# Patient Record
Sex: FEMALE | Race: WHITE | Employment: FULL TIME | URBAN - METROPOLITAN AREA
[De-identification: names, ages, dates, MRNs, and addresses within clinical notes are randomized per-mention and may not be internally consistent; named-entity substitution may affect disease eponyms.]

---

## 2023-12-07 DIAGNOSIS — N92.6 MISSED PERIOD: ICD-10-CM

## 2023-12-07 LAB
ABO + RH BLD: NORMAL
BASOPHILS # BLD: 0 K/UL (ref 0–0.2)
BASOPHILS NFR BLD: 1 % (ref 0–2)
BLOOD GROUP ANTIBODIES SERPL: NORMAL
DIFFERENTIAL METHOD BLD: ABNORMAL
EOSINOPHIL # BLD: 0.3 K/UL (ref 0–0.8)
EOSINOPHIL NFR BLD: 3 % (ref 0.5–7.8)
ERYTHROCYTE [DISTWIDTH] IN BLOOD BY AUTOMATED COUNT: 14.7 % (ref 11.9–14.6)
HBV SURFACE AG SER QL: NONREACTIVE
HCT VFR BLD AUTO: 36.9 % (ref 35.8–46.3)
HCV AB SER QL: NONREACTIVE
HGB BLD-MCNC: 11.7 G/DL (ref 11.7–15.4)
HIV 1+2 AB+HIV1 P24 AG SERPL QL IA: NONREACTIVE
HIV 1/2 RESULT COMMENT: NORMAL
IMM GRANULOCYTES # BLD AUTO: 0 K/UL (ref 0–0.5)
IMM GRANULOCYTES NFR BLD AUTO: 0 % (ref 0–5)
LYMPHOCYTES # BLD: 1.5 K/UL (ref 0.5–4.6)
LYMPHOCYTES NFR BLD: 21 % (ref 13–44)
MCH RBC QN AUTO: 25.9 PG (ref 26.1–32.9)
MCHC RBC AUTO-ENTMCNC: 31.7 G/DL (ref 31.4–35)
MCV RBC AUTO: 81.6 FL (ref 82–102)
MONOCYTES # BLD: 0.5 K/UL (ref 0.1–1.3)
MONOCYTES NFR BLD: 7 % (ref 4–12)
NEUTS SEG # BLD: 4.9 K/UL (ref 1.7–8.2)
NEUTS SEG NFR BLD: 67 % (ref 43–78)
NRBC # BLD: 0 K/UL (ref 0–0.2)
PLATELET # BLD AUTO: 137 K/UL (ref 150–450)
PMV BLD AUTO: 14.9 FL (ref 9.4–12.3)
RBC # BLD AUTO: 4.52 M/UL (ref 4.05–5.2)
RUBV IGG SERPL IA-ACNC: 79.5 IU/ML
WBC # BLD AUTO: 7.3 K/UL (ref 4.3–11.1)

## 2023-12-08 LAB — RPR SER QL: NONREACTIVE

## 2024-01-02 NOTE — PROGRESS NOTES
None   Other Topics Concern    Not on file   Social History Narrative    Not on file     Social Determinants of Health     Financial Resource Strain: Low Risk  (5/3/2023)    Overall Financial Resource Strain (CARDIA)     Difficulty of Paying Living Expenses: Not hard at all   Food Insecurity: Not on file (5/3/2023)   Transportation Needs: Unknown (5/3/2023)    PRAPARE - Transportation     Lack of Transportation (Medical): Not on file     Lack of Transportation (Non-Medical): No   Physical Activity: Not on file   Stress: Not on file   Social Connections: Not on file   Intimate Partner Violence: Not on file   Housing Stability: Unknown (5/3/2023)    Housing Stability Vital Sign     Unable to Pay for Housing in the Last Year: Not on file     Number of Places Lived in the Last Year: Not on file     Unstable Housing in the Last Year: No     Family History   Problem Relation Age of Onset    Osteoarthritis Mother     Headache Brother     Asthma Brother     Psychiatric Disorder Brother         Dissociative personality disorder, Depression    Prostate Cancer Maternal Grandfather 60    Colon Cancer Maternal Grandfather     Breast Cancer Neg Hx     Ovarian Cancer Neg Hx     Deep Vein Thrombosis Neg Hx     Pulmonary Embolism Neg Hx      Allergies   Allergen Reactions    Hydrocodone-Acetaminophen Rash     Developed a rash after taking Lortab while pregnant. No issues since then.      Current Outpatient Medications on File Prior to Visit   Medication Sig Dispense Refill    Prenatal Vit-Fe Fumarate-FA (PRENATAL VITAMIN PO) Take 1 tablet by mouth daily       No current facility-administered medications on file prior to visit.       Review of Systems   Constitutional: Negative.    HENT: Negative.     Respiratory: Negative.     Cardiovascular: Negative.    Gastrointestinal: Negative.    Genitourinary:  Negative for menstrual problem, pelvic pain and vaginal discharge.   Musculoskeletal: Negative.    Neurological: Negative.

## 2024-01-04 ENCOUNTER — ROUTINE PRENATAL (OUTPATIENT)
Dept: OBGYN CLINIC | Age: 34
End: 2024-01-04
Payer: COMMERCIAL

## 2024-01-04 VITALS
BODY MASS INDEX: 22.66 KG/M2 | SYSTOLIC BLOOD PRESSURE: 110 MMHG | WEIGHT: 153 LBS | DIASTOLIC BLOOD PRESSURE: 72 MMHG | HEIGHT: 69 IN

## 2024-01-04 DIAGNOSIS — Z34.81 ENCOUNTER FOR SUPERVISION OF OTHER NORMAL PREGNANCY IN FIRST TRIMESTER: ICD-10-CM

## 2024-01-04 DIAGNOSIS — Z34.81 ENCOUNTER FOR SUPERVISION OF OTHER NORMAL PREGNANCY, FIRST TRIMESTER: ICD-10-CM

## 2024-01-04 DIAGNOSIS — O34.219 PREVIOUS CESAREAN DELIVERY AFFECTING PREGNANCY: Primary | ICD-10-CM

## 2024-01-04 DIAGNOSIS — Z87.59 HISTORY OF GESTATIONAL HYPERTENSION: ICD-10-CM

## 2024-01-04 LAB
ABO + RH BLD: NORMAL
BASOPHILS # BLD: 0.1 K/UL (ref 0–0.2)
BASOPHILS NFR BLD: 1 % (ref 0–2)
BLOOD GROUP ANTIBODIES SERPL: NORMAL
DIFFERENTIAL METHOD BLD: ABNORMAL
EOSINOPHIL # BLD: 0.2 K/UL (ref 0–0.8)
EOSINOPHIL NFR BLD: 3 % (ref 0.5–7.8)
ERYTHROCYTE [DISTWIDTH] IN BLOOD BY AUTOMATED COUNT: 15.2 % (ref 11.9–14.6)
GLUCOSE URINE, POC: NEGATIVE
HBV SURFACE AG SER QL: NONREACTIVE
HCT VFR BLD AUTO: 37.4 % (ref 35.8–46.3)
HCV AB SER QL: NONREACTIVE
HGB BLD-MCNC: 11.8 G/DL (ref 11.7–15.4)
HIV 1+2 AB+HIV1 P24 AG SERPL QL IA: NONREACTIVE
HIV 1/2 RESULT COMMENT: NORMAL
IMM GRANULOCYTES # BLD AUTO: 0 K/UL (ref 0–0.5)
IMM GRANULOCYTES NFR BLD AUTO: 0 % (ref 0–5)
LYMPHOCYTES # BLD: 0.9 K/UL (ref 0.5–4.6)
LYMPHOCYTES NFR BLD: 12 % (ref 13–44)
MCH RBC QN AUTO: 25.8 PG (ref 26.1–32.9)
MCHC RBC AUTO-ENTMCNC: 31.6 G/DL (ref 31.4–35)
MCV RBC AUTO: 81.7 FL (ref 82–102)
MONOCYTES # BLD: 0.5 K/UL (ref 0.1–1.3)
MONOCYTES NFR BLD: 6 % (ref 4–12)
NEUTS SEG # BLD: 6.5 K/UL (ref 1.7–8.2)
NEUTS SEG NFR BLD: 79 % (ref 43–78)
NRBC # BLD: 0 K/UL (ref 0–0.2)
PLATELET # BLD AUTO: 146 K/UL (ref 150–450)
PMV BLD AUTO: ABNORMAL FL (ref 9.4–12.3)
PROTEIN,URINE, POC: NEGATIVE
RBC # BLD AUTO: 4.58 M/UL (ref 4.05–5.2)
WBC # BLD AUTO: 8.2 K/UL (ref 4.3–11.1)

## 2024-01-04 PROCEDURE — 81002 URINALYSIS NONAUTO W/O SCOPE: CPT | Performed by: OBSTETRICS & GYNECOLOGY

## 2024-01-04 PROCEDURE — 0501F PRENATAL FLOW SHEET: CPT | Performed by: OBSTETRICS & GYNECOLOGY

## 2024-01-04 ASSESSMENT — ENCOUNTER SYMPTOMS
GASTROINTESTINAL NEGATIVE: 1
RESPIRATORY NEGATIVE: 1

## 2024-01-05 LAB
RPR SER QL: NONREACTIVE
RUBV IGG SERPL IA-ACNC: 97.6 IU/ML

## 2024-01-12 LAB
Lab: NEGATIVE
Lab: NEGATIVE
Lab: NORMAL
NTRA 1P36 DELETION SYNDROME POPULATION-BASED RISK TEXT: NORMAL
NTRA 1P36 DELETION SYNDROME RESULT TEXT: NORMAL
NTRA 1P36 DELETION SYNDROME RISK SCORE TEXT: NORMAL
NTRA 22Q11.2 DELETION SYNDROME POPULATION-BASED RISK TEXT: NORMAL
NTRA 22Q11.2 DELETION SYNDROME RESULT TEXT: NORMAL
NTRA 22Q11.2 DELETION SYNDROME RISK SCORE TEXT: NORMAL
NTRA ANGELMAN SYNDROME POPULATION-BASED RISK TEXT: NORMAL
NTRA ANGELMAN SYNDROME RESULT TEXT: NORMAL
NTRA ANGELMAN SYNDROME RISK SCORE TEXT: NORMAL
NTRA CRI-DU-CHAT SYNDROME POPULATION-BASED RISK TEXT: NORMAL
NTRA CRI-DU-CHAT SYNDROME RESULT TEXT: NORMAL
NTRA CRI-DU-CHAT SYNDROME RISK SCORE TEXT: NORMAL
NTRA CYSTIC FIBROSIS: NEGATIVE
NTRA FETAL FRACTION SECOND FETUS: NORMAL
NTRA FETAL FRACTION: NORMAL
NTRA FRAGILE X SYNDROME: NEGATIVE
NTRA GENDER OF FETUS: NORMAL
NTRA GENDER OF SECOND FETUS: NORMAL
NTRA MONOSOMY X AGE-BASED RISK TEXT: NORMAL
NTRA MONOSOMY X RESULT TEXT: NORMAL
NTRA MONOSOMY X RISK SCORE TEXT: NORMAL
NTRA PDF REPORT: NORMAL
NTRA PRADER-WILLI SYNDROME POPULATION-BASED RISK TEXT: NORMAL
NTRA PRADER-WILLI SYNDROME RESULT TEXT: NORMAL
NTRA PRADER-WILLI SYNDROME RISK SCORE TEXT: NORMAL
NTRA SPINAL MUSCULAR ATROPHY: NEGATIVE
NTRA TRIPLOIDY 13 18 AGE-BASED RISK TEXT: NORMAL
NTRA TRIPLOIDY 13 18 RESULT TEXT: NORMAL
NTRA TRIPLOIDY 13 18 RISK SCORE TEXT: NORMAL
NTRA TRIPLOIDY RESULT TEXT: NORMAL
NTRA TRISOMY 13 AGE-BASED RISK TEXT: NORMAL
NTRA TRISOMY 13 RESULT TEXT: NORMAL
NTRA TRISOMY 13 RISK SCORE TEXT: NORMAL
NTRA TRISOMY 18 AGE-BASED RISK TEXT: NORMAL
NTRA TRISOMY 18 RESULT TEXT: NORMAL
NTRA TRISOMY 18 RISK SCORE TEXT: NORMAL
NTRA TRISOMY 21 AGE-BASED RISK TEXT: NORMAL
NTRA TRISOMY 21 RESULT TEXT: NORMAL
NTRA TRISOMY 21 RISK SCORE TEXT: NORMAL
NTRA ZYGOSITY: NORMAL

## 2024-01-23 ENCOUNTER — NURSE ONLY (OUTPATIENT)
Dept: OBGYN CLINIC | Age: 34
End: 2024-01-23

## 2024-01-23 VITALS — WEIGHT: 153.6 LBS | BODY MASS INDEX: 22.85 KG/M2

## 2024-01-23 DIAGNOSIS — Z87.59 HISTORY OF GESTATIONAL HYPERTENSION: ICD-10-CM

## 2024-01-23 DIAGNOSIS — Z34.82 PRENATAL CARE, SUBSEQUENT PREGNANCY IN SECOND TRIMESTER: ICD-10-CM

## 2024-01-23 DIAGNOSIS — R87.810 ASCUS WITH POSITIVE HIGH RISK HPV CERVICAL: ICD-10-CM

## 2024-01-23 DIAGNOSIS — Z3A.15 15 WEEKS GESTATION OF PREGNANCY: ICD-10-CM

## 2024-01-23 DIAGNOSIS — O09.522 ADVANCED MATERNAL AGE IN MULTIGRAVIDA, SECOND TRIMESTER: Primary | ICD-10-CM

## 2024-01-23 DIAGNOSIS — R87.610 ASCUS WITH POSITIVE HIGH RISK HPV CERVICAL: ICD-10-CM

## 2024-01-23 LAB
ALBUMIN SERPL-MCNC: 3.1 G/DL (ref 3.5–5)
ALBUMIN/GLOB SERPL: 0.8 (ref 0.4–1.6)
ALP SERPL-CCNC: 62 U/L (ref 50–136)
ALT SERPL-CCNC: 17 U/L (ref 12–65)
ANION GAP SERPL CALC-SCNC: 3 MMOL/L (ref 2–11)
AST SERPL-CCNC: 11 U/L (ref 15–37)
BILIRUB SERPL-MCNC: 0.2 MG/DL (ref 0.2–1.1)
BUN SERPL-MCNC: 5 MG/DL (ref 6–23)
CALCIUM SERPL-MCNC: 8.2 MG/DL (ref 8.3–10.4)
CHLORIDE SERPL-SCNC: 109 MMOL/L (ref 103–113)
CO2 SERPL-SCNC: 25 MMOL/L (ref 21–32)
CREAT SERPL-MCNC: 0.6 MG/DL (ref 0.6–1)
CREAT UR-MCNC: 96 MG/DL
ERYTHROCYTE [DISTWIDTH] IN BLOOD BY AUTOMATED COUNT: 16.1 % (ref 11.9–14.6)
GLOBULIN SER CALC-MCNC: 3.9 G/DL (ref 2.8–4.5)
GLUCOSE SERPL-MCNC: 92 MG/DL (ref 65–100)
HCT VFR BLD AUTO: 34.2 % (ref 35.8–46.3)
HGB BLD-MCNC: 10.8 G/DL (ref 11.7–15.4)
LDH SERPL L TO P-CCNC: 180 U/L (ref 100–190)
MCH RBC QN AUTO: 25.7 PG (ref 26.1–32.9)
MCHC RBC AUTO-ENTMCNC: 31.6 G/DL (ref 31.4–35)
MCV RBC AUTO: 81.4 FL (ref 82–102)
NRBC # BLD: 0 K/UL (ref 0–0.2)
PLATELET # BLD AUTO: 126 K/UL (ref 150–450)
PMV BLD AUTO: ABNORMAL FL (ref 9.4–12.3)
POTASSIUM SERPL-SCNC: 3.6 MMOL/L (ref 3.5–5.1)
PROT SERPL-MCNC: 7 G/DL (ref 6.3–8.2)
PROT UR-MCNC: 10 MG/DL
PROT/CREAT UR-RTO: 0.1
RBC # BLD AUTO: 4.2 M/UL (ref 4.05–5.2)
SODIUM SERPL-SCNC: 137 MMOL/L (ref 136–146)
URATE SERPL-MCNC: 3.2 MG/DL (ref 2.6–6)
WBC # BLD AUTO: 6.8 K/UL (ref 4.3–11.1)

## 2024-01-23 NOTE — PATIENT INSTRUCTIONS
medical care if:    You have signs of preeclampsia, such as:  Sudden swelling of your face, hands, or feet.  New vision problems (such as dimness, blurring, or seeing spots).  A severe headache.     You have symptoms of a blood clot in your leg (called a deep vein thrombosis), such as:  Pain in the calf, back of the knee, thigh, or groin.  Swelling in the leg or groin.  A color change on the leg or groin. The skin may be reddish or purplish, depending on your usual skin color.     You have any vaginal bleeding.     You have belly pain or cramping.     You have a fever.     You've been having regular contractions for an hour. This means that you've had at least 6 contractions within 1 hour, even after you change your position and drink fluids.     You have symptoms of a urinary tract infection. These may include:  Pain or burning when you urinate.  A frequent need to urinate without being able to pass much urine.  Pain in your low back (below the rib cage and above the waist).  Blood in your urine.     You have a sudden release of fluid from your vagina.     You have low back pain or pelvic pressure that does not go away.     You notice that your baby has stopped moving or is moving less than normal.   Watch closely for changes in your health, and be sure to contact your doctor if:    You have vaginal discharge that smells bad.     You feel sad, anxious, or hopeless for more than a few days.     You have other concerns about your pregnancy.   Where can you learn more?  Go to https://www.[employer].net/patientEd and enter Y951 to learn more about \"Pregnancy Precautions: Care Instructions.\"  Current as of: July 11, 2023               Content Version: 13.9  © 2377-1814 [employer].   Care instructions adapted under license by [employer]. If you have questions about a medical condition or this instruction, always ask your healthcare professional. [employer] disclaims any warranty or liability

## 2024-01-23 NOTE — PROGRESS NOTES
NOB consult with pt. Labs today: Urine Cx (not collected at previous visit), Toxoplasma Abs (due to cat living in home) and Baseline Hellp labs (Due to hx GHTN). Offered pt the option of CF, SMA, and Fragile X carrier testing. Pt desires testing (collected on 24-Neg). Offered pt the option of genetic screening (1st screen vs Tetra vs NIPT). Pt desires NIPT (Already collected on -Low Risk). Instructed pt on exercise/nutrition in pregnancy. Reviewed [facility] preg book. Advised pt on using [facility] for hospital needs and [facility] L&D for pregnancy related emergencies. Pt states understanding. NOB forms signed, scanned, and given to pt.     Medical Hx: Abnormal Pap (2020 ASCUS + HR HPV (during pregnancy) no F/U pap found in EMR (2023)  5/3/23  LGSIL + HPV  23  Colpo:  12:00 LGSIL    2, 6 & 10:00 all nl.   ECC nl---> R pap 1 year, Anemia (no hx iron tx), Asthma (Bronchiole asthma-due to mother smoking when younger), Endometriosis (Laparoscopy ~ 14 yo, Dr Robles told pt + endometriosis: uterus, ovary, intestine), GERD (since G1), HSV-2 infection (Diagnosed when she was 16. Has not had any outbreaks in years), Hx GHTN, Multiple atypical nevi, Thrombocytopenia.     Surgical Hx:  Section x2 ( and ), Colposcopy (), Laparoscopy due to  endometriosis:  involving her: uterus, ovaries and intestines, Tonsillectomy and [place] Tooth Extraction.     Last Pap: 2023: LSIL/+HPV, S/p Colpo: 12:00 LSIL, 2, 6 and 10 all nl. Will repeat pap in 1 year. Pt notified that STD screening will be done at [facility].      Hx abnormal paps:     2020  ASCUS + HR HPV (during pregnancy) no F/U pap found in EMR (2023)  5/3/23  LGSIL + HPV  23  Colpo:  12:00 LGSIL    2, 6 & 10:00 all nl.   ECC nl---> R pap 1 year.    Pt OB c/o: Patient with hx hemorrhoids. Has been using Preparation H and Tucks. States constipation depends on her diet. Discussed use of Miralax and Colace as needed.     Patient also states had nausea in the

## 2024-01-24 LAB
T GONDII IGG SERPL IA-ACNC: 5.1 IU/ML (ref 0–7.1)
T GONDII IGM SER IA-ACNC: <3 AU/ML (ref 0–7.9)
TOXOPLASMA COMMENT: NORMAL

## 2024-01-26 LAB
BACTERIA SPEC CULT: NORMAL
BACTERIA SPEC CULT: NORMAL
SERVICE CMNT-IMP: NORMAL

## 2024-02-01 ENCOUNTER — ROUTINE PRENATAL (OUTPATIENT)
Dept: OBGYN CLINIC | Age: 34
End: 2024-02-01

## 2024-02-01 VITALS
HEIGHT: 69 IN | BODY MASS INDEX: 22.81 KG/M2 | DIASTOLIC BLOOD PRESSURE: 62 MMHG | WEIGHT: 154 LBS | SYSTOLIC BLOOD PRESSURE: 120 MMHG

## 2024-02-01 DIAGNOSIS — J45.20 MILD INTERMITTENT ASTHMA WITHOUT COMPLICATION: ICD-10-CM

## 2024-02-01 DIAGNOSIS — O09.522 MULTIGRAVIDA OF ADVANCED MATERNAL AGE IN SECOND TRIMESTER: ICD-10-CM

## 2024-02-01 DIAGNOSIS — Z87.59 HISTORY OF GESTATIONAL HYPERTENSION: ICD-10-CM

## 2024-02-01 DIAGNOSIS — Z11.3 SCREEN FOR STD (SEXUALLY TRANSMITTED DISEASE): ICD-10-CM

## 2024-02-01 DIAGNOSIS — O98.512 HERPES SIMPLEX TYPE 2 (HSV-2) INFECTION AFFECTING PREGNANCY, ANTEPARTUM, SECOND TRIMESTER: ICD-10-CM

## 2024-02-01 DIAGNOSIS — D50.9 IRON DEFICIENCY ANEMIA, UNSPECIFIED IRON DEFICIENCY ANEMIA TYPE: ICD-10-CM

## 2024-02-01 DIAGNOSIS — B00.9 HERPES SIMPLEX TYPE 2 (HSV-2) INFECTION AFFECTING PREGNANCY, ANTEPARTUM, SECOND TRIMESTER: ICD-10-CM

## 2024-02-01 DIAGNOSIS — Z34.82 ENCOUNTER FOR SUPERVISION OF OTHER NORMAL PREGNANCY IN SECOND TRIMESTER: Primary | ICD-10-CM

## 2024-02-01 DIAGNOSIS — O34.219 PREVIOUS CESAREAN DELIVERY AFFECTING PREGNANCY: ICD-10-CM

## 2024-02-01 PROCEDURE — 0502F SUBSEQUENT PRENATAL CARE: CPT | Performed by: OBSTETRICS & GYNECOLOGY

## 2024-02-01 NOTE — PROGRESS NOTES
OB return  Skyla Perez is a 33 y.o. female   with 17w1d IUP with Estimated Date of Delivery: 7/10/24 presenting to the clinic as a routine OB.   Denies any complaints.    Problem list reviewed and updated    Pregnancy complicated by:  1. Previous CD x2- for repeat CD  2. Hx GHTN- not on bp meds, recommend two baby ASA  3. Hx of abnormal pap smear  without followup and - sp colpo ,next due in   4. Asthma- well controlled  5. HSV2- ppx at 36 weeks  6. Anemia  7. AMA- 33 yr old     Physical Examination:  /62   Ht 1.74 m (5' 8.5\")   Wt 69.9 kg (154 lb)   LMP 10/04/2023 (Exact Date)   BMI 23.08 kg/m²    Gen: AAOx3  Ab: soft, NTTP, gravid uterus  Skin: no edema    Plan:  --nuswab today  --RTC 4 weeks with anatomy

## 2024-02-04 LAB
A VAGINAE DNA VAG QL NAA+PROBE: NORMAL SCORE
BVAB2 DNA VAG QL NAA+PROBE: NORMAL SCORE
C ALBICANS DNA VAG QL NAA+PROBE: NEGATIVE
C GLABRATA DNA VAG QL NAA+PROBE: NEGATIVE
MEGA1 DNA VAG QL NAA+PROBE: NORMAL SCORE
SPECIMEN SOURCE: NORMAL

## 2024-02-06 LAB
A VAGINAE DNA VAG QL NAA+PROBE: NORMAL SCORE
BVAB2 DNA VAG QL NAA+PROBE: NORMAL SCORE
C ALBICANS DNA VAG QL NAA+PROBE: NEGATIVE
C GLABRATA DNA VAG QL NAA+PROBE: NEGATIVE
C TRACH RRNA SPEC QL NAA+PROBE: NEGATIVE
MEGA1 DNA VAG QL NAA+PROBE: NORMAL SCORE
N GONORRHOEA RRNA SPEC QL NAA+PROBE: NEGATIVE
SPECIMEN SOURCE: NORMAL
T VAGINALIS RRNA SPEC QL NAA+PROBE: NEGATIVE

## 2024-02-22 ENCOUNTER — ROUTINE PRENATAL (OUTPATIENT)
Dept: OBGYN CLINIC | Age: 34
End: 2024-02-22
Payer: COMMERCIAL

## 2024-02-22 VITALS — SYSTOLIC BLOOD PRESSURE: 114 MMHG | DIASTOLIC BLOOD PRESSURE: 66 MMHG

## 2024-02-22 DIAGNOSIS — Z13.32 ENCOUNTER FOR SCREENING FOR MATERNAL DEPRESSION: ICD-10-CM

## 2024-02-22 DIAGNOSIS — O99.012 ANEMIA AFFECTING PREGNANCY IN SECOND TRIMESTER: ICD-10-CM

## 2024-02-22 DIAGNOSIS — O09.522 HIGH-RISK PREGNANCY, ELDERLY MULTIGRAVIDA, SECOND TRIMESTER: ICD-10-CM

## 2024-02-22 DIAGNOSIS — O98.512 HERPES SIMPLEX TYPE 2 (HSV-2) INFECTION AFFECTING PREGNANCY, ANTEPARTUM, SECOND TRIMESTER: ICD-10-CM

## 2024-02-22 DIAGNOSIS — O99.119 THROMBOCYTOPENIA AFFECTING PREGNANCY, ANTEPARTUM (HCC): Primary | ICD-10-CM

## 2024-02-22 DIAGNOSIS — Z87.59 HISTORY OF GESTATIONAL HYPERTENSION: ICD-10-CM

## 2024-02-22 DIAGNOSIS — O34.219 PREVIOUS CESAREAN DELIVERY AFFECTING PREGNANCY: ICD-10-CM

## 2024-02-22 DIAGNOSIS — Z3A.20 20 WEEKS GESTATION OF PREGNANCY: ICD-10-CM

## 2024-02-22 DIAGNOSIS — D69.6 THROMBOCYTOPENIA AFFECTING PREGNANCY, ANTEPARTUM (HCC): Primary | ICD-10-CM

## 2024-02-22 DIAGNOSIS — B00.9 HERPES SIMPLEX TYPE 2 (HSV-2) INFECTION AFFECTING PREGNANCY, ANTEPARTUM, SECOND TRIMESTER: ICD-10-CM

## 2024-02-22 PROBLEM — Z34.81 ENCOUNTER FOR SUPERVISION OF OTHER NORMAL PREGNANCY, FIRST TRIMESTER: Status: RESOLVED | Noted: 2020-01-30 | Resolved: 2024-02-22

## 2024-02-22 PROBLEM — J45.20 MILD INTERMITTENT ASTHMA WITHOUT COMPLICATION: Status: RESOLVED | Noted: 2024-02-01 | Resolved: 2024-02-22

## 2024-02-22 PROBLEM — K21.00 GASTROESOPHAGEAL REFLUX DISEASE WITH ESOPHAGITIS: Status: RESOLVED | Noted: 2019-05-03 | Resolved: 2024-02-22

## 2024-02-22 PROBLEM — Z64.1 MULTIPAROUS: Status: RESOLVED | Noted: 2020-07-06 | Resolved: 2024-02-22

## 2024-02-22 PROBLEM — D22.9 MULTIPLE ATYPICAL NEVI: Status: RESOLVED | Noted: 2019-07-24 | Resolved: 2024-02-22

## 2024-02-22 PROCEDURE — 76825 ECHO EXAM OF FETAL HEART: CPT | Performed by: OBSTETRICS & GYNECOLOGY

## 2024-02-22 PROCEDURE — 93325 DOPPLER ECHO COLOR FLOW MAPG: CPT | Performed by: OBSTETRICS & GYNECOLOGY

## 2024-02-22 PROCEDURE — 99204 OFFICE O/P NEW MOD 45 MIN: CPT | Performed by: OBSTETRICS & GYNECOLOGY

## 2024-02-22 PROCEDURE — 76811 OB US DETAILED SNGL FETUS: CPT | Performed by: OBSTETRICS & GYNECOLOGY

## 2024-02-22 PROCEDURE — 96127 BRIEF EMOTIONAL/BEHAV ASSMT: CPT | Performed by: OBSTETRICS & GYNECOLOGY

## 2024-02-22 PROCEDURE — 76827 ECHO EXAM OF FETAL HEART: CPT | Performed by: OBSTETRICS & GYNECOLOGY

## 2024-02-22 RX ORDER — IRON FUM,PS CMP/VIT C/NIACIN 125-40-3MG
1 CAPSULE ORAL DAILY
Qty: 30 CAPSULE | Refills: 5 | Status: SHIPPED | OUTPATIENT
Start: 2024-02-22

## 2024-02-22 RX ORDER — ACETAMINOPHEN 500 MG
500 TABLET ORAL EVERY 6 HOURS PRN
COMMUNITY

## 2024-02-22 RX ORDER — ASPIRIN 81 MG/1
81 TABLET ORAL DAILY
Qty: 90 TABLET | Refills: 0 | Status: SHIPPED | OUTPATIENT
Start: 2024-02-22

## 2024-02-22 ASSESSMENT — PATIENT HEALTH QUESTIONNAIRE - PHQ9
SUM OF ALL RESPONSES TO PHQ QUESTIONS 1-9: 0
2. FEELING DOWN, DEPRESSED OR HOPELESS: 0
SUM OF ALL RESPONSES TO PHQ9 QUESTIONS 1 & 2: 0
1. LITTLE INTEREST OR PLEASURE IN DOING THINGS: 0
SUM OF ALL RESPONSES TO PHQ QUESTIONS 1-9: 0

## 2024-02-22 NOTE — PATIENT INSTRUCTIONS
Resources for Depression/Anxiety  Postpartum Support International (PSI).    PSI Warmline:  2-406-081-4PPD (6281).  WWW.[email or web address].NET    Mom's IMPACTT  [email or web address]

## 2024-02-22 NOTE — PROGRESS NOTES
pregnancy in second trimester      Priority: Medium     Overview Note:     24 [facility]: Anemia noted on recent lab studies. Physiologic anemia of pregnancy is usually mild.   Start iron supplementation. Rx given.     Counseled to take iron with vitamin C, and to avoid taking with iron rich meals.     Take with stool softener, if not included in iron formulation.              Assessment & Plan Note:     Anemia in pregnancy can be defined as follows, based mostly on data in nonpregnant individuals-   First trimester - Hemoglobin <11 g/dL   Second trimester - Hemoglobin <10.5 g/dL   Third trimester - Hemoglobin <11 g/dL   Postpartum - Hemoglobin <10 g/dL     Anemia in pregnant and postpartum individuals correlates with negative  outcomes.     Throughout pregnancy, iron deficiency anemia adversely affects the maternal and fetal well-being, and is linked to increased morbidity and fetal death. Affected mothers frequently experience breathing difficulties, fainting, tiredness, palpitations, and sleep difficulties. Complaints of pica are common. Iron deficiency during the first trimester, has a more negative impact on fetal growth than anemia developing later in pregnancy. This is also true for risk of premature labor. Later in pregnancy there is increased risk of developing  infection, preeclampsia, and severe postpartum hemorrhage. Adverse  outcomes include premature labor, intrauterine growth retardation, low birth weight, birth asphyxia, and  anemia. Postpartum anemia is linked with depression, emotional instability, feelings of poor quality of life. Additionally, it also may reduce quality/quantity of breast milk.     Lowered iron stores maternal iron stores result in lower fetal/ iron stores. This deficit may persist for up to one year and result in iron deficiency anemia. Such a state should be identified and treated promptly because of the possible long term

## 2024-02-22 NOTE — ASSESSMENT & PLAN NOTE
Discussed with patient the long-term and recurrence risks associated with hypertensive disorder of pregnancy.     With a history of hypertensive disease in pregnancy, patient has increased risk of cardiovascular and cerebrovascular events in the future. A recent metaanalysis by Angelica, et al. demonstrated that over a mean follow-up of 14.1 years, the relative risk of developing chronic hypertension in those with a history of preeclampsia was 3.7. In addition, it predisposes to microalbuminuria and long lasting renal disease. Carefully chosen antihypertensives can lower the risk for both kidney and cardiac events among those with hypertension. Patient counseled to let her PCP know about this history.     All women with history of hypertensive disorders of pregnancy should maintain a healthy weight, stay active, avoid tobacco use, and be evaluated regularly for cardiovascular disease.      Her risk of recurrence (of any Hypertensive Disorder of Pregnancy) is approximately 25-40%. Recommend low dose Aspirin x 2 tablets (162 mg daily) is recommended to be started by 12-16 weeks; some benefit seen with starting up to 28 weeks.     Recommend at least 12 months between pregnancies for future children.     Preeclampsia.org for information and preeclampsia registry.     
History of Genital Herpes    Patients with recurrent genital herpes are at risk of an outbreak at the time of labor.  The newborns in these cases are at a low risk of mars the herpes virus due to immunity passed from the mother.  Among women with recurrent lesions at the time of delivery, the rate of transmission with a vaginal delivery is only 3%. For women with a history of recurrent disease and no visible lesions at delivery, the transmission risk has been estimated to be 2/10,000. The low risk is in part attributed to the presence and transplacental passage of antiherpes antibodies.  delivery is not indicated in women with a history of HSV in the absence of active genital lesions or prodromes.     Acyclovir and Valacyclovir have been repeatedly shown to decrease the incidence of recurrent HSV outbreak at the time of labor reducing the incidence of  delivery. The routine use of antiviral suppression in the third trimester has become the recommendation of ACOG in patients with a history of genital herpes.  Valtrex suppression with 500 mgs po bid starting at 36 weeks is an acceptable option for the patient who is worried about a recurrent episode and wants to avoid a  delivery.     
Thrombocytopenia in pregnancy    Platelets decline in routine pregnancies as it progresses. In an effort to not over-treat/diagnosis thrombocytopenia in pregnancy, a cut off of 100K should be used.     Differential Diagnosis of Thrombocytopenia in pregnancy  Gestational Thrombocytopenia (59%)  Diagnosis of exclusion  Generally has a jasbir no lower than 75K, resolves post partum  No increased bleeding or bruising.  No associated abnormalities on complete blood count (CBC).  No fetal or  thrombocytopenia  Risk of recurrent GT is 14-fold greater if history in prior pregnancies.     Autoimmune Thrombocytopenia  May decrease dramatically  May require steroids or IVIG.   If unresponsive to therapy, hematology should be involved in care as will not generally resolve postpartum.       Preeclampsia/HELLP (22%)  Other- antiphospholipid syndrome, disseminated intravascular coagulation, dilutional thrombocytopenia, myeloproliferative neoplasm (8%)    Goal in 3rd trimester is to keep platelet count > 100K.  Recommend monthly platelet counts in your office.  Consider use of citrated tube to verify accuracy as  it will prevent platelet \"clumping\" that is sometimes seen when drawn in purple top tube and gives falsely low count.  If platelet counts falls < 75K at any gestational age, steroids are recommended to raise platelet count.  There is no risk of maternal bleeding until platelet levels reach 50K.  At 32 weeks, platelet counts should be > 100K so that patient can receive an epidural.    Consider oral steroid course at 32 weeks in platelet count < 100K.    
both language and motor development. Breastfeeding is usually protective, but not if the mother is iron deficient. It has been noted that iron levels in breast milk fall as lactation progresses over time. Careful monitoring and adequate supplementation is therefore needed for infants at risk.    Anemia evaluation in pregnancy should evaluate hemoglobinopathies (including alpha thal, beta thal, sickle cell disease), iron deficiency, folate deficiency, B12 deficiency. Additionally, physiologic anemia must be considered as sole or contributing cause. Other less common etiologies may also be present (autoimmune, red cell disorders, G6PD deficiencies etc).  Treatment then is directed to appropriate etiology.     Please draw CBC, reticulocyte count, transferrin saturation (includes TIBC, TSat, iron), B12 level, serum ferritin at next OB appt  If not performed previously, check hemoglobin electrophoresis.     
sample.  Positive predictive and negative predictive values for these tests were explained, questions answered. Patient understands that these are screening tests that only assesses risk for select abnormalities (trisomies 13, 18, and 21, and sex chromosome abnormalities (NIPT), as well as markers for placental health (MAKEDA-A) and risk for open neural tube defects (quad)).  NIPT is designed for high risk populations, but should be considered by all patients who desire the current best option for screening for applicable genetic abnormalities.     Limitations of technology discussed based on maternal age, technical aspects of tests, and maternal BMI reviewed.  All questions answered and concerns discussed.     Patient elected to proceed with NIPT previously at OB office- results low risk.        Other events that occur with increased frequency with increasing maternal age include hypertensive disease. For this reason, I recommend daily 162mg ASA for early/severe preeclampsia prevention.     Third Trimester  There are no large randomized trials that have examined the efficacy of routine antepartum testing in women age 35 and older, therefore there is no consensus on the management of late pregnancy for this population. One strategy is to stratify women based on their risk factors, such as age and parity, and to consider other factors that might influence risk, such as pregestational obesity and race.   Recommend fetal growth evaluation at 32-34 weeks in those 35 years or older.   The risk of stillbirth increases with increasing maternal age such that women ?40 years of age have the same risk of stillbirth at 39 weeks of gestation as women in their mid-20s have at 41 weeks of gestation. For this reason, we recommend beginning testing at 37 weeks in those 40 years of age and older due to stillbirth risk.     Consider induction at 39 weeks of gestation for women who are ?35 years and primiparous, ?39 years of age, of

## 2024-02-29 ENCOUNTER — PATIENT MESSAGE (OUTPATIENT)
Dept: OBGYN CLINIC | Age: 34
End: 2024-02-29

## 2024-02-29 ENCOUNTER — ROUTINE PRENATAL (OUTPATIENT)
Dept: OBGYN CLINIC | Age: 34
End: 2024-02-29

## 2024-02-29 VITALS — WEIGHT: 156.4 LBS | BODY MASS INDEX: 23.43 KG/M2 | SYSTOLIC BLOOD PRESSURE: 124 MMHG | DIASTOLIC BLOOD PRESSURE: 60 MMHG

## 2024-02-29 DIAGNOSIS — Z87.59 HISTORY OF GESTATIONAL HYPERTENSION: ICD-10-CM

## 2024-02-29 DIAGNOSIS — D69.6 THROMBOCYTOPENIA AFFECTING PREGNANCY, ANTEPARTUM (HCC): Primary | ICD-10-CM

## 2024-02-29 DIAGNOSIS — O99.119 THROMBOCYTOPENIA AFFECTING PREGNANCY, ANTEPARTUM (HCC): Primary | ICD-10-CM

## 2024-02-29 DIAGNOSIS — O09.522 HIGH-RISK PREGNANCY, ELDERLY MULTIGRAVIDA, SECOND TRIMESTER: ICD-10-CM

## 2024-02-29 DIAGNOSIS — O34.219 PREVIOUS CESAREAN DELIVERY AFFECTING PREGNANCY: ICD-10-CM

## 2024-02-29 DIAGNOSIS — D69.6 THROMBOCYTOPENIA AFFECTING PREGNANCY, ANTEPARTUM (HCC): ICD-10-CM

## 2024-02-29 DIAGNOSIS — O98.512 HERPES SIMPLEX TYPE 2 (HSV-2) INFECTION AFFECTING PREGNANCY, ANTEPARTUM, SECOND TRIMESTER: ICD-10-CM

## 2024-02-29 DIAGNOSIS — B00.9 HERPES SIMPLEX TYPE 2 (HSV-2) INFECTION AFFECTING PREGNANCY, ANTEPARTUM, SECOND TRIMESTER: ICD-10-CM

## 2024-02-29 DIAGNOSIS — O99.119 THROMBOCYTOPENIA AFFECTING PREGNANCY, ANTEPARTUM (HCC): ICD-10-CM

## 2024-02-29 LAB
ERYTHROCYTE [DISTWIDTH] IN BLOOD BY AUTOMATED COUNT: 16.3 % (ref 11.9–14.6)
HCT VFR BLD AUTO: 34.7 % (ref 35.8–46.3)
HGB BLD-MCNC: 10.9 G/DL (ref 11.7–15.4)
MCH RBC QN AUTO: 26.4 PG (ref 26.1–32.9)
MCHC RBC AUTO-ENTMCNC: 31.4 G/DL (ref 31.4–35)
MCV RBC AUTO: 84 FL (ref 82–102)
NRBC # BLD: 0 K/UL (ref 0–0.2)
PLATELET # BLD AUTO: 134 K/UL (ref 150–450)
PMV BLD AUTO: ABNORMAL FL (ref 9.4–12.3)
RBC # BLD AUTO: 4.13 M/UL (ref 4.05–5.2)
WBC # BLD AUTO: 8.8 K/UL (ref 4.3–11.1)

## 2024-02-29 PROCEDURE — 0501F PRENATAL FLOW SHEET: CPT | Performed by: OBSTETRICS & GYNECOLOGY

## 2024-02-29 NOTE — PROGRESS NOTES
OB return  Skyla Perez is a 33 y.o. female   with 21w1d IUP with Estimated Date of Delivery: 7/10/24 presenting to the clinic as a routine OB.   Denies any complaints.  Problem list reviewed and updated    Pregnancy complicated by:  1. Previous CD x2- for repeat CD  2. Hx GHTN- not on bp meds, recommend two baby ASA  3. Hx of abnormal pap smear  without followup and - sp colpo ,next due in   4. Asthma- well controlled  5. HSV2- ppx at 36 weeks  6. Anemia and thrombocytopenia- H/H 10/34, plts 126, monthly check on plts  7. AMA- 33 yr old, negative NIPT, normal anatomy    Physical Examination:  /60   Wt 70.9 kg (156 lb 6.4 oz)   LMP 10/04/2023 (Exact Date)   BMI 23.43 kg/m²    Gen: AAOx3  Ab: soft, NTTP, gravid uterus  Skin: no edema    Plan:  --RTC 4 weeks with glucola

## 2024-02-29 NOTE — PATIENT INSTRUCTIONS
problems. It's also a good idea to know your test results and keep a list of the medicines you take.  Where can you learn more?  Go to https://www.[employer].net/patientEd and enter A472 to learn more about \"Learning About Screening for Gestational Diabetes.\"  Current as of: February 28, 2023               Content Version: 13.9  © 2397-0672 [employer].   Care instructions adapted under license by [employer]. If you have questions about a medical condition or this instruction, always ask your healthcare professional. [employer] disclaims any warranty or liability for your use of this information.

## 2024-03-21 ENCOUNTER — ROUTINE PRENATAL (OUTPATIENT)
Dept: OBGYN CLINIC | Age: 34
End: 2024-03-21

## 2024-03-21 VITALS — DIASTOLIC BLOOD PRESSURE: 70 MMHG | SYSTOLIC BLOOD PRESSURE: 120 MMHG

## 2024-03-21 DIAGNOSIS — Z3A.24 24 WEEKS GESTATION OF PREGNANCY: ICD-10-CM

## 2024-03-21 DIAGNOSIS — O99.012 ANEMIA AFFECTING PREGNANCY IN SECOND TRIMESTER: ICD-10-CM

## 2024-03-21 DIAGNOSIS — O99.119 THROMBOCYTOPENIA AFFECTING PREGNANCY, ANTEPARTUM (HCC): ICD-10-CM

## 2024-03-21 DIAGNOSIS — Z82.79 FAMILY HISTORY OF CONGENITAL HEART DEFECT: ICD-10-CM

## 2024-03-21 DIAGNOSIS — O99.512 ASTHMA AFFECTING PREGNANCY IN SECOND TRIMESTER: ICD-10-CM

## 2024-03-21 DIAGNOSIS — D69.6 THROMBOCYTOPENIA AFFECTING PREGNANCY, ANTEPARTUM (HCC): ICD-10-CM

## 2024-03-21 DIAGNOSIS — Z87.59 HISTORY OF GESTATIONAL HYPERTENSION: ICD-10-CM

## 2024-03-21 DIAGNOSIS — J45.909 ASTHMA AFFECTING PREGNANCY IN SECOND TRIMESTER: ICD-10-CM

## 2024-03-21 DIAGNOSIS — O98.512 HERPES SIMPLEX TYPE 2 (HSV-2) INFECTION AFFECTING PREGNANCY, ANTEPARTUM, SECOND TRIMESTER: ICD-10-CM

## 2024-03-21 DIAGNOSIS — O34.219 PREVIOUS CESAREAN DELIVERY AFFECTING PREGNANCY: ICD-10-CM

## 2024-03-21 DIAGNOSIS — Z13.32 ENCOUNTER FOR SCREENING FOR MATERNAL DEPRESSION: ICD-10-CM

## 2024-03-21 DIAGNOSIS — O09.522 HIGH-RISK PREGNANCY, ELDERLY MULTIGRAVIDA, SECOND TRIMESTER: Primary | ICD-10-CM

## 2024-03-21 DIAGNOSIS — B00.9 HERPES SIMPLEX TYPE 2 (HSV-2) INFECTION AFFECTING PREGNANCY, ANTEPARTUM, SECOND TRIMESTER: ICD-10-CM

## 2024-03-21 ASSESSMENT — PATIENT HEALTH QUESTIONNAIRE - PHQ9
SUM OF ALL RESPONSES TO PHQ QUESTIONS 1-9: 0
SUM OF ALL RESPONSES TO PHQ QUESTIONS 1-9: 0
1. LITTLE INTEREST OR PLEASURE IN DOING THINGS: NOT AT ALL
SUM OF ALL RESPONSES TO PHQ QUESTIONS 1-9: 0
SUM OF ALL RESPONSES TO PHQ9 QUESTIONS 1 & 2: 0
SUM OF ALL RESPONSES TO PHQ QUESTIONS 1-9: 0
2. FEELING DOWN, DEPRESSED OR HOPELESS: NOT AT ALL

## 2024-03-21 NOTE — ASSESSMENT & PLAN NOTE
Asthma in pregnancy can be potentially life-threatening to mother and fetus.  Often, asthma will worsen in early third trimester. Recommend close monitoring and preventative therapy and aggressive treatment of exacerbations.      Patients with mild or well-controlled asthma will use an inhaled Beta-agonist (Albuterol type inhaler) prn or every 4-6 hours to control asthma. If patient requires use of rescue inhaler more than 2 times per week, worsening pulmonary function should be treated with the addition of an inhaled corticosteroid (ex. Flovent) BID.    For severe asthma or significant worsening of condition on the first 2 combined medications, patient should be assessed by her primary care physician. However, oral Prednisone 60 mgs daily for 1 week followed by a 10 day taper may be used.      Recommendation to decrease asthma triggers; Optimize seasonal allergy control.      testing twice weekly is recommended for severe asthma beginning at 32 weeks with growth assessment each month.     Avoid hemabate for treatment of uterine atony/pp hemorrhage.

## 2024-03-21 NOTE — ASSESSMENT & PLAN NOTE
Congenital heart defects occur in approximately 1% of pregnancies.  Congenital heart defects may occur due to multifactorial influences, chromosomal abnormalities, genetic syndromes or environmental exposures.  Isolated heart defects are generally multifactorial.  The overall prognosis is dependent on the severity of the heart defect, and whether or not it is due to an underlying chromosome or genetic problem.  Chromosomal and syndromic etiologies may be associated with other birth defects and mental retardation.  Risk for recurrence depends on etiology.

## 2024-03-21 NOTE — PROGRESS NOTES
[facility]    [address] [place] [address]  P- [phone]  [phone]       MFM Follow-up Visit  Skyla Perez (1990) is a 34 y.o.  at 24w1d with 7/10/2024, by Last Menstrual Period.      Presents for evaluation of the following chief complaint(s):   Chief Complaint   Patient presents with    High Risk Pregnancy     AMA, Anemia, Asthma, FH CHD, HSV, Hx GHTN, Thrombocytopenia     Patient is working at  as evening shift ; supportive supervisor.       Patient is scheduled to see Primary OB ([facility]) on 3/28/24.     and daughter are present today.     Interval history since prior appt reviewed and updated as indicated.      No recent HA's.  Denies Edema. Denies Pre-eclamptic symptoms. No bleeding or LOF.  No contractions or cramping. Has vaginal pressure , improves with voiding. Reports good fetal movement. Reports heartburn, improves with Tums.      Mood evaluated today based on discussion with pt and PHQ screen.       3/21/2024     1:28 PM   PHQ-9    Little interest or pleasure in doing things 0   Feeling down, depressed, or hopeless 0   PHQ-2 Score 0   PHQ-9 Total Score 0      Mood Reassuring today  Recommend lifestyle/behavioral modifications to enhance mood (exercise, sunshine, mood apps, nutritional modifications, etc).     Addressed normal pregnancy complaints, reassured and offered suggestions for care  Reviewed gestational age precautions and activity goals/limitations  Nutritional counseling as well as specific goals based on current maternal and fetal status  Options for GERD, constipation, other common complaints reviewed.   Reviewed gestational age appropriate preventive care regarding communicable disease transmission and vaccines as appropriate (including flu, TDaP >28wk, RSV 32-36wk, and COVID.)  Mood counseling today    Exam:     Vitals:    24 1408   BP: 120/70      Physical Exam  Applicable labs reviewed.   Please

## 2024-03-21 NOTE — PATIENT INSTRUCTIONS
In order to optimize maternal, fetal, and  health, we recommend the following vaccinations.   Flu- yearly (https://www.highriskpregnancyinfo.org/flu-facts-for-pregnancy)  Consider Covid vaccination/booster (https://www.highriskpregnancyinfo.org/covid-19-pregnancy)  TDaP after 28 weeks each pregnancy (https://www.highriskpregnancyinfo.org/tdap)  Consider RSV vaccine 32-36 weeks of pregnancy, if Sept- January.  (https://www.highriskpregnancyinfo.org/rsv)

## 2024-03-28 ENCOUNTER — ROUTINE PRENATAL (OUTPATIENT)
Dept: OBGYN CLINIC | Age: 34
End: 2024-03-28

## 2024-03-28 VITALS — WEIGHT: 159 LBS | BODY MASS INDEX: 23.82 KG/M2 | DIASTOLIC BLOOD PRESSURE: 58 MMHG | SYSTOLIC BLOOD PRESSURE: 130 MMHG

## 2024-03-28 DIAGNOSIS — Z13.1 SCREENING FOR DIABETES MELLITUS: Primary | ICD-10-CM

## 2024-03-28 DIAGNOSIS — O99.012 ANEMIA AFFECTING PREGNANCY IN SECOND TRIMESTER: ICD-10-CM

## 2024-03-28 DIAGNOSIS — D69.6 THROMBOCYTOPENIA AFFECTING PREGNANCY, ANTEPARTUM (HCC): ICD-10-CM

## 2024-03-28 DIAGNOSIS — O99.119 THROMBOCYTOPENIA AFFECTING PREGNANCY, ANTEPARTUM (HCC): ICD-10-CM

## 2024-03-28 DIAGNOSIS — O09.522 HIGH-RISK PREGNANCY, ELDERLY MULTIGRAVIDA, SECOND TRIMESTER: Primary | ICD-10-CM

## 2024-03-28 DIAGNOSIS — O34.219 PREVIOUS CESAREAN DELIVERY AFFECTING PREGNANCY: ICD-10-CM

## 2024-03-28 DIAGNOSIS — Z13.1 SCREENING FOR DIABETES MELLITUS: ICD-10-CM

## 2024-03-28 LAB
ERYTHROCYTE [DISTWIDTH] IN BLOOD BY AUTOMATED COUNT: 16.8 % (ref 11.9–14.6)
GLUCOSE 1 HOUR: 119 MG/DL
HCT VFR BLD AUTO: 35 % (ref 35.8–46.3)
HGB BLD-MCNC: 11 G/DL (ref 11.7–15.4)
MCH RBC QN AUTO: 27.8 PG (ref 26.1–32.9)
MCHC RBC AUTO-ENTMCNC: 31.4 G/DL (ref 31.4–35)
MCV RBC AUTO: 88.4 FL (ref 82–102)
NRBC # BLD: 0 K/UL (ref 0–0.2)
PLATELET # BLD AUTO: 115 K/UL (ref 150–450)
PMV BLD AUTO: ABNORMAL FL (ref 9.4–12.3)
RBC # BLD AUTO: 3.96 M/UL (ref 4.05–5.2)
WBC # BLD AUTO: 8.7 K/UL (ref 4.3–11.1)

## 2024-03-28 PROCEDURE — 0502F SUBSEQUENT PRENATAL CARE: CPT | Performed by: OBSTETRICS & GYNECOLOGY

## 2024-04-24 NOTE — PROGRESS NOTES
OB return  Skyla Perez is a 34 y.o. female   with 29w1d IUP with Estimated Date of Delivery: 7/10/24 presenting to the clinic as a routine OB.   Denies any complaints.  Passed 1 hour glucola    Problem list reviewed and updated    Pregnancy complicated by:  1. AMA- 33 yr old, negative NIPT, normal anatomy  Plan: serial growth 32-34 weeks, delivery at 39 weeks  2. Previous CD x2- for repeat CD  3. Hx GHTN- not on bp meds, recommend two baby ASA  4. Hx of abnormal pap smear  without followup and - sp colpo ,next due in   5. Asthma- well controlled  6. HSV2- ppx at 36 weeks  7. Anemia and thrombocytopenia- H/H 10/34, plts 126,   Plan: monthly check on plts     Physical Examination:  /70   Wt 73.9 kg (163 lb)   LMP 10/04/2023 (Exact Date)   BMI 24.42 kg/m²    Gen: AAOx3  Ab: soft, NTTP, gravid uterus  Skin: no edema    Plan:  --RTC 2 weeks

## 2024-04-25 ENCOUNTER — ROUTINE PRENATAL (OUTPATIENT)
Dept: OBGYN CLINIC | Age: 34
End: 2024-04-25

## 2024-04-25 VITALS — BODY MASS INDEX: 24.42 KG/M2 | DIASTOLIC BLOOD PRESSURE: 70 MMHG | WEIGHT: 163 LBS | SYSTOLIC BLOOD PRESSURE: 106 MMHG

## 2024-04-25 DIAGNOSIS — O99.119 THROMBOCYTOPENIA AFFECTING PREGNANCY, ANTEPARTUM (HCC): ICD-10-CM

## 2024-04-25 DIAGNOSIS — O34.219 PREVIOUS CESAREAN DELIVERY AFFECTING PREGNANCY: ICD-10-CM

## 2024-04-25 DIAGNOSIS — D69.6 THROMBOCYTOPENIA AFFECTING PREGNANCY, ANTEPARTUM (HCC): ICD-10-CM

## 2024-04-25 DIAGNOSIS — O98.512 HERPES SIMPLEX TYPE 2 (HSV-2) INFECTION AFFECTING PREGNANCY, ANTEPARTUM, SECOND TRIMESTER: ICD-10-CM

## 2024-04-25 DIAGNOSIS — B00.9 HERPES SIMPLEX TYPE 2 (HSV-2) INFECTION AFFECTING PREGNANCY, ANTEPARTUM, SECOND TRIMESTER: ICD-10-CM

## 2024-04-25 DIAGNOSIS — O09.522 HIGH-RISK PREGNANCY, ELDERLY MULTIGRAVIDA, SECOND TRIMESTER: Primary | ICD-10-CM

## 2024-04-25 DIAGNOSIS — Z87.59 HISTORY OF GESTATIONAL HYPERTENSION: ICD-10-CM

## 2024-04-25 PROCEDURE — 0502F SUBSEQUENT PRENATAL CARE: CPT | Performed by: OBSTETRICS & GYNECOLOGY

## 2024-04-25 RX ORDER — OMEPRAZOLE 20 MG/1
20 CAPSULE, DELAYED RELEASE ORAL DAILY
COMMUNITY

## 2024-05-07 ENCOUNTER — ROUTINE PRENATAL (OUTPATIENT)
Dept: OBGYN CLINIC | Age: 34
End: 2024-05-07

## 2024-05-07 VITALS — SYSTOLIC BLOOD PRESSURE: 100 MMHG | WEIGHT: 163.8 LBS | BODY MASS INDEX: 24.54 KG/M2 | DIASTOLIC BLOOD PRESSURE: 62 MMHG

## 2024-05-07 DIAGNOSIS — D69.6 THROMBOCYTOPENIA AFFECTING PREGNANCY, ANTEPARTUM (HCC): ICD-10-CM

## 2024-05-07 DIAGNOSIS — O99.119 THROMBOCYTOPENIA AFFECTING PREGNANCY, ANTEPARTUM (HCC): Primary | ICD-10-CM

## 2024-05-07 DIAGNOSIS — O09.523 HIGH-RISK PREGNANCY, ELDERLY MULTIGRAVIDA IN THIRD TRIMESTER: ICD-10-CM

## 2024-05-07 DIAGNOSIS — B00.9 HERPES SIMPLEX TYPE 2 (HSV-2) INFECTION AFFECTING PREGNANCY, ANTEPARTUM, SECOND TRIMESTER: ICD-10-CM

## 2024-05-07 DIAGNOSIS — O99.119 THROMBOCYTOPENIA AFFECTING PREGNANCY, ANTEPARTUM (HCC): ICD-10-CM

## 2024-05-07 DIAGNOSIS — D69.6 THROMBOCYTOPENIA AFFECTING PREGNANCY, ANTEPARTUM (HCC): Primary | ICD-10-CM

## 2024-05-07 DIAGNOSIS — O98.512 HERPES SIMPLEX TYPE 2 (HSV-2) INFECTION AFFECTING PREGNANCY, ANTEPARTUM, SECOND TRIMESTER: ICD-10-CM

## 2024-05-07 DIAGNOSIS — O34.219 PREVIOUS CESAREAN DELIVERY AFFECTING PREGNANCY: ICD-10-CM

## 2024-05-07 LAB
ERYTHROCYTE [DISTWIDTH] IN BLOOD BY AUTOMATED COUNT: 15 % (ref 11.9–14.6)
HCT VFR BLD AUTO: 33.6 % (ref 35.8–46.3)
HGB BLD-MCNC: 10.7 G/DL (ref 11.7–15.4)
MCH RBC QN AUTO: 28.2 PG (ref 26.1–32.9)
MCHC RBC AUTO-ENTMCNC: 31.8 G/DL (ref 31.4–35)
MCV RBC AUTO: 88.7 FL (ref 82–102)
NRBC # BLD: 0 K/UL (ref 0–0.2)
PLATELET # BLD AUTO: 117 K/UL (ref 150–450)
PMV BLD AUTO: 14.4 FL (ref 9.4–12.3)
RBC # BLD AUTO: 3.79 M/UL (ref 4.05–5.2)
WBC # BLD AUTO: 9.4 K/UL (ref 4.3–11.1)

## 2024-05-07 PROCEDURE — 0502F SUBSEQUENT PRENATAL CARE: CPT | Performed by: OBSTETRICS & GYNECOLOGY

## 2024-05-07 NOTE — PROGRESS NOTES
OB return  Skyla Perez is a 34 y.o. female   with 30w6d IUP with Estimated Date of Delivery: 7/10/24 presenting to the clinic as a routine OB.   Denies any complaints.    Problem list reviewed and updated    Pregnancy complicated by:  1. AMA- 33 yr old, negative NIPT, normal anatomy  Plan: serial growth 32-34 weeks, delivery at 39 weeks    2. Previous CD x2- for repeat CD    3. Hx GHTN- not on bp meds, recommend two baby ASA    4. Hx of abnormal pap smear  without followup and - sp colpo ,next due in     5. Asthma- well controlled    6. HSV2- ppx at 36 weeks    7. Anemia and thrombocytopenia-   Plan: monthly check on plts        Physical Examination:  /62   Wt 74.3 kg (163 lb 12.8 oz)   LMP 10/04/2023 (Exact Date)   BMI 24.54 kg/m²    Gen: AAOx3  Ab: soft, NTTP, gravid uterus  Skin: no edema    Plan:  --CBC labs today  --RTC 2 weeks, growth US, will also look at calendar and schedule repeat LTCD,  when I am on call?

## 2024-05-18 SDOH — ECONOMIC STABILITY: FOOD INSECURITY: WITHIN THE PAST 12 MONTHS, THE FOOD YOU BOUGHT JUST DIDN'T LAST AND YOU DIDN'T HAVE MONEY TO GET MORE.: NEVER TRUE

## 2024-05-18 SDOH — ECONOMIC STABILITY: HOUSING INSECURITY
IN THE LAST 12 MONTHS, WAS THERE A TIME WHEN YOU DID NOT HAVE A STEADY PLACE TO SLEEP OR SLEPT IN A SHELTER (INCLUDING NOW)?: NO

## 2024-05-18 SDOH — ECONOMIC STABILITY: FOOD INSECURITY: WITHIN THE PAST 12 MONTHS, YOU WORRIED THAT YOUR FOOD WOULD RUN OUT BEFORE YOU GOT MONEY TO BUY MORE.: NEVER TRUE

## 2024-05-18 SDOH — ECONOMIC STABILITY: TRANSPORTATION INSECURITY
IN THE PAST 12 MONTHS, HAS LACK OF TRANSPORTATION KEPT YOU FROM MEETINGS, WORK, OR FROM GETTING THINGS NEEDED FOR DAILY LIVING?: NO

## 2024-05-18 SDOH — ECONOMIC STABILITY: INCOME INSECURITY: HOW HARD IS IT FOR YOU TO PAY FOR THE VERY BASICS LIKE FOOD, HOUSING, MEDICAL CARE, AND HEATING?: NOT VERY HARD

## 2024-05-21 ENCOUNTER — TELEPHONE (OUTPATIENT)
Dept: OBGYN CLINIC | Age: 34
End: 2024-05-21

## 2024-05-21 ENCOUNTER — ROUTINE PRENATAL (OUTPATIENT)
Dept: OBGYN CLINIC | Age: 34
End: 2024-05-21

## 2024-05-21 ENCOUNTER — PROCEDURE VISIT (OUTPATIENT)
Dept: OBGYN CLINIC | Age: 34
End: 2024-05-21
Payer: COMMERCIAL

## 2024-05-21 VITALS — SYSTOLIC BLOOD PRESSURE: 116 MMHG | BODY MASS INDEX: 24.99 KG/M2 | DIASTOLIC BLOOD PRESSURE: 62 MMHG | WEIGHT: 166.8 LBS

## 2024-05-21 DIAGNOSIS — Z87.59 HISTORY OF GESTATIONAL HYPERTENSION: ICD-10-CM

## 2024-05-21 DIAGNOSIS — O09.523 HIGH-RISK PREGNANCY, ELDERLY MULTIGRAVIDA IN THIRD TRIMESTER: ICD-10-CM

## 2024-05-21 DIAGNOSIS — O99.119 THROMBOCYTOPENIA AFFECTING PREGNANCY, ANTEPARTUM (HCC): ICD-10-CM

## 2024-05-21 DIAGNOSIS — Z3A.32 32 WEEKS GESTATION OF PREGNANCY: ICD-10-CM

## 2024-05-21 DIAGNOSIS — D69.6 THROMBOCYTOPENIA AFFECTING PREGNANCY, ANTEPARTUM (HCC): ICD-10-CM

## 2024-05-21 DIAGNOSIS — O09.523 HIGH-RISK PREGNANCY, ELDERLY MULTIGRAVIDA IN THIRD TRIMESTER: Primary | ICD-10-CM

## 2024-05-21 DIAGNOSIS — O34.219 PREVIOUS CESAREAN DELIVERY AFFECTING PREGNANCY: Primary | ICD-10-CM

## 2024-05-21 PROCEDURE — 0502F SUBSEQUENT PRENATAL CARE: CPT | Performed by: OBSTETRICS & GYNECOLOGY

## 2024-05-21 PROCEDURE — 76816 OB US FOLLOW-UP PER FETUS: CPT | Performed by: OBSTETRICS & GYNECOLOGY

## 2024-05-21 SDOH — ECONOMIC STABILITY: INCOME INSECURITY: HOW HARD IS IT FOR YOU TO PAY FOR THE VERY BASICS LIKE FOOD, HOUSING, MEDICAL CARE, AND HEATING?: NOT VERY HARD

## 2024-05-21 SDOH — ECONOMIC STABILITY: FOOD INSECURITY: WITHIN THE PAST 12 MONTHS, YOU WORRIED THAT YOUR FOOD WOULD RUN OUT BEFORE YOU GOT MONEY TO BUY MORE.: NEVER TRUE

## 2024-05-21 SDOH — ECONOMIC STABILITY: FOOD INSECURITY: WITHIN THE PAST 12 MONTHS, THE FOOD YOU BOUGHT JUST DIDN'T LAST AND YOU DIDN'T HAVE MONEY TO GET MORE.: NEVER TRUE

## 2024-05-21 NOTE — TELEPHONE ENCOUNTER
C section scheduled with [facility].  C section on 7/3 at 9:00 am with Dr Alvarez d/t repeat LTCD.   Pt notified of instructions.

## 2024-05-21 NOTE — PROGRESS NOTES
OB return  Skyla Perez is a 34 y.o. female   with 32w6d IUP with Estimated Date of Delivery: 7/10/24 presenting to the clinic as a routine OB.   Denies any complaints.    Problem list reviewed and updated    OB US report:  Images reviewed today. Report scanned into media  Indication: AMA, growth US  Findings: vertex, 2393g, 67%, AC 63%, HC 99%, ANNY 21  Assessment: normal growth      Pregnancy complicated by:  1. AMA- 33 yr old, negative NIPT, normal anatomy  Plan: serial growth today, delivery at 39 weeks     2. Previous CD x2- for repeat CD     3. Hx GHTN- not on bp meds, recommend two baby ASA     4. Hx of abnormal pap smear  without followup and - sp colpo ,next due in      5. Asthma- well controlled     6. HSV2- ppx at 36 weeks     7. Anemia and thrombocytopenia-   Plan: monthly check on plts     Physical Examination:  /62   Wt 75.7 kg (166 lb 12.8 oz)   LMP 10/04/2023 (Exact Date)   BMI 24.99 kg/m²    Gen: AAOx3  Ab: soft, NTTP, gravid uterus  Skin: no edema    Plan:  --will schedule Repeat LTCD , 39w0d  --RTC 2 weeks

## 2024-06-04 ENCOUNTER — ROUTINE PRENATAL (OUTPATIENT)
Dept: OBGYN CLINIC | Age: 34
End: 2024-06-04

## 2024-06-04 VITALS — SYSTOLIC BLOOD PRESSURE: 110 MMHG | BODY MASS INDEX: 25.47 KG/M2 | DIASTOLIC BLOOD PRESSURE: 60 MMHG | WEIGHT: 170 LBS

## 2024-06-04 DIAGNOSIS — O99.012 ANEMIA AFFECTING PREGNANCY IN SECOND TRIMESTER: ICD-10-CM

## 2024-06-04 DIAGNOSIS — D69.6 THROMBOCYTOPENIA AFFECTING PREGNANCY, ANTEPARTUM (HCC): ICD-10-CM

## 2024-06-04 DIAGNOSIS — O09.523 HIGH-RISK PREGNANCY, ELDERLY MULTIGRAVIDA IN THIRD TRIMESTER: ICD-10-CM

## 2024-06-04 DIAGNOSIS — O99.119 THROMBOCYTOPENIA AFFECTING PREGNANCY, ANTEPARTUM (HCC): ICD-10-CM

## 2024-06-04 DIAGNOSIS — O34.219 PREVIOUS CESAREAN DELIVERY AFFECTING PREGNANCY: Primary | ICD-10-CM

## 2024-06-04 LAB
ERYTHROCYTE [DISTWIDTH] IN BLOOD BY AUTOMATED COUNT: 14.4 % (ref 11.9–14.6)
HCT VFR BLD AUTO: 33.8 % (ref 35.8–46.3)
HGB BLD-MCNC: 10.8 G/DL (ref 11.7–15.4)
MCH RBC QN AUTO: 27.8 PG (ref 26.1–32.9)
MCHC RBC AUTO-ENTMCNC: 32 G/DL (ref 31.4–35)
MCV RBC AUTO: 87.1 FL (ref 82–102)
NRBC # BLD: 0 K/UL (ref 0–0.2)
PLATELET # BLD AUTO: 112 K/UL (ref 150–450)
PMV BLD AUTO: ABNORMAL FL (ref 9.4–12.3)
RBC # BLD AUTO: 3.88 M/UL (ref 4.05–5.2)
WBC # BLD AUTO: 9.5 K/UL (ref 4.3–11.1)

## 2024-06-04 PROCEDURE — 0502F SUBSEQUENT PRENATAL CARE: CPT | Performed by: OBSTETRICS & GYNECOLOGY

## 2024-06-04 RX ORDER — VALACYCLOVIR HYDROCHLORIDE 500 MG/1
500 TABLET, FILM COATED ORAL 2 TIMES DAILY
Qty: 60 TABLET | Refills: 0 | Status: SHIPPED | OUTPATIENT
Start: 2024-06-04 | End: 2024-07-04

## 2024-06-04 NOTE — PROGRESS NOTES
OB return  Skyla Perez is a 34 y.o. female   with 34w6d IUP with Estimated Date of Delivery: 7/10/24 presenting to the clinic as a routine OB.   Denies any complaints.    Problem list reviewed and updated    Pregnancy complicated by:  1. AMA- 33 yr old, negative NIPT, normal anatomy  Plan: serial growth today, delivery at 39 weeks     2. Previous CD x2- for repeat CD, repeat scheduled 7/3/2024     3. Hx GHTN- not on bp meds, recommend two baby ASA     4. Hx of abnormal pap smear  without followup and - sp colpo ,next due in      5. Asthma- well controlled     6. HSV2- ppx at 36 weeks     7. Anemia and thrombocytopenia- stable  Plan: monthly check on plts     Physical Examination:  /60   Wt 77.1 kg (170 lb)   LMP 10/04/2023 (Exact Date)   BMI 25.47 kg/m²    Gen: AAOx3  Ab: soft, NTTP, gravid uterus  Skin: no edema    Plan:  --valtrex sent for patient  --Labs cbc ordered  --RTC 1 weeks

## 2024-06-10 NOTE — PROGRESS NOTES
OB return  Skyla Perez is a 34 y.o. female   with 35w6d IUP with Estimated Date of Delivery: 7/10/24 presenting to the clinic as a routine OB.   Denies any complaints.    Problem list reviewed and updated    Pregnancy complicated by:  1. AMA- 33 yr old, negative NIPT, normal anatomy  Plan: serial growth today, delivery at 39 weeks     2. Previous CD x2- for repeat CD, repeat scheduled 7/3/2024     3. Hx GHTN- not on bp meds, recommend two baby ASA     4. Hx of abnormal pap smear  without followup and - sp colpo ,next due in      5. Asthma- well controlled     6. HSV2- ppx at 36 weeks     7. Anemia and thrombocytopenia- stable  Plan: monthly check on plts     Physical Examination:  /78   Wt 80.3 kg (177 lb)   LMP 10/04/2023 (Exact Date)   BMI 26.52 kg/m²    Gen: AAOx3  Ab: soft, NTTP, gravid uterus  Skin: no edema    Plan:  --Labs gbs ordered  --RTC 1 weeks

## 2024-06-11 ENCOUNTER — ROUTINE PRENATAL (OUTPATIENT)
Dept: OBGYN CLINIC | Age: 34
End: 2024-06-11

## 2024-06-11 VITALS — BODY MASS INDEX: 26.52 KG/M2 | DIASTOLIC BLOOD PRESSURE: 78 MMHG | SYSTOLIC BLOOD PRESSURE: 120 MMHG | WEIGHT: 177 LBS

## 2024-06-11 DIAGNOSIS — B00.9 HERPES SIMPLEX TYPE 2 (HSV-2) INFECTION AFFECTING PREGNANCY, ANTEPARTUM, SECOND TRIMESTER: ICD-10-CM

## 2024-06-11 DIAGNOSIS — O99.012 ANEMIA AFFECTING PREGNANCY IN SECOND TRIMESTER: ICD-10-CM

## 2024-06-11 DIAGNOSIS — O98.512 HERPES SIMPLEX TYPE 2 (HSV-2) INFECTION AFFECTING PREGNANCY, ANTEPARTUM, SECOND TRIMESTER: ICD-10-CM

## 2024-06-11 DIAGNOSIS — Z11.8 SCREENING EXAMINATION FOR PARASITIC INFECTION: ICD-10-CM

## 2024-06-11 DIAGNOSIS — O34.219 PREVIOUS CESAREAN DELIVERY AFFECTING PREGNANCY: ICD-10-CM

## 2024-06-11 DIAGNOSIS — D69.6 THROMBOCYTOPENIA AFFECTING PREGNANCY, ANTEPARTUM (HCC): ICD-10-CM

## 2024-06-11 DIAGNOSIS — Z36.85 ANTENATAL SCREENING FOR STREPTOCOCCUS B: ICD-10-CM

## 2024-06-11 DIAGNOSIS — O09.523 HIGH-RISK PREGNANCY, ELDERLY MULTIGRAVIDA IN THIRD TRIMESTER: Primary | ICD-10-CM

## 2024-06-11 DIAGNOSIS — O99.119 THROMBOCYTOPENIA AFFECTING PREGNANCY, ANTEPARTUM (HCC): ICD-10-CM

## 2024-06-11 DIAGNOSIS — Z82.79 FAMILY HISTORY OF CONGENITAL HEART DEFECT: ICD-10-CM

## 2024-06-11 DIAGNOSIS — O99.512 ASTHMA AFFECTING PREGNANCY IN SECOND TRIMESTER: ICD-10-CM

## 2024-06-11 DIAGNOSIS — Z11.3 SCREEN FOR STD (SEXUALLY TRANSMITTED DISEASE): ICD-10-CM

## 2024-06-11 DIAGNOSIS — J45.909 ASTHMA AFFECTING PREGNANCY IN SECOND TRIMESTER: ICD-10-CM

## 2024-06-11 DIAGNOSIS — Z87.59 HISTORY OF GESTATIONAL HYPERTENSION: ICD-10-CM

## 2024-06-11 PROCEDURE — 0502F SUBSEQUENT PRENATAL CARE: CPT | Performed by: OBSTETRICS & GYNECOLOGY

## 2024-06-14 LAB
C TRACH RRNA SPEC QL NAA+PROBE: NEGATIVE
N GONORRHOEA RRNA SPEC QL NAA+PROBE: NEGATIVE
SPECIMEN SOURCE: NORMAL

## 2024-06-15 LAB
BACTERIA SPEC CULT: NORMAL
SERVICE CMNT-IMP: NORMAL

## 2024-06-16 NOTE — PROGRESS NOTES
OB return  Skyla Perez is a 34 y.o. female   with 36w6d IUP with Estimated Date of Delivery: 7/10/24 presenting to the clinic as a routine OB.   Denies any complaints.  GBS negative    Problem list reviewed and updated    Pregnancy complicated by:  1. AMA- 33 yr old, negative NIPT, normal anatomy  Plan: serial growth, delivery at 39 weeks     2. Previous CD x2- for repeat CD, repeat scheduled 7/3/2024     3. Hx GHTN- not on bp meds, recommend two baby ASA     4. Hx of abnormal pap smear  without followup and - sp colpo ,next due in      5. Asthma- well controlled     6. HSV2- ppx at 36 weeks     7. Anemia and thrombocytopenia- stable  Plan: monthly check on plts, last done - H/H 10.8/33.8, plts 112    Physical Examination:  /66   Wt 80.7 kg (178 lb)   LMP 10/04/2023 (Exact Date)   BMI 26.67 kg/m²    Gen: AAOx3  Ab: soft, NTTP, gravid uterus  Skin: no edema    Plan:  --RTC 1 weeks

## 2024-06-18 ENCOUNTER — ROUTINE PRENATAL (OUTPATIENT)
Dept: OBGYN CLINIC | Age: 34
End: 2024-06-18

## 2024-06-18 VITALS — BODY MASS INDEX: 26.67 KG/M2 | DIASTOLIC BLOOD PRESSURE: 66 MMHG | WEIGHT: 178 LBS | SYSTOLIC BLOOD PRESSURE: 130 MMHG

## 2024-06-18 DIAGNOSIS — O09.523 HIGH-RISK PREGNANCY, ELDERLY MULTIGRAVIDA IN THIRD TRIMESTER: Primary | ICD-10-CM

## 2024-06-18 DIAGNOSIS — O34.219 PREVIOUS CESAREAN DELIVERY AFFECTING PREGNANCY: ICD-10-CM

## 2024-06-18 PROCEDURE — 0502F SUBSEQUENT PRENATAL CARE: CPT | Performed by: OBSTETRICS & GYNECOLOGY

## 2024-06-24 NOTE — PROGRESS NOTES
OB return  Skyla Perez is a 34 y.o. female  with 37w6d IUP with Estimated Date of Delivery: 7/10/24 presenting to the clinic as a routine OB.     She reports experiencing [place] contractions however, denies feeling them regularly. She also denies leakage of fluid or vaginal bleeding and reports active fetal movement.    Denies HA, blurred vision or RUQ pain.     FH: 37cm  FHTs: 130s    Problem list reviewed and updated    Pregnancy complicated by:  1. AMA- 33 yr old, negative NIPT, normal anatomy. 24 [facility]: Reassuring fetal status. Normal f/u anatomy/echo, AC- 75. For full details review formal ultrasound report.   Plan: serial growth, delivery at 39 weeks     2. Previous CD x2- for repeat CD, repeat scheduled 7/3/2024     3. Hx GHTN- not on bp meds, recommend two baby ASA     4. Hx of abnormal pap smear  without followup and - sp colpo ,next due in      5. Asthma- well controlled     6. HSV2- ppx at 36 weeks, taking Valtrex suppression.      7. Anemia and thrombocytopenia- stable  Plan: monthly check on plts, last done 6/4- H/H 10.8/33.8, plts 112    Physical Examination:  /68   Wt 78.9 kg (174 lb)   LMP 10/04/2023 (Exact Date)   BMI 26.07 kg/m²    Gen: AAOx3  Ab: soft, NTTP, gravid uterus  Skin: no edema    Plan:  --GBS and Gc/C neg. Discussed results with patient today.   --SVE declined  --Discussed upcoming repeat  scheduled 7/3/2024.  --Will plan for her to RTC 2 weeks after delivery for incision check.     PTL/labor precautions, FMC, and pregnancy warning signs reviewed. Pt advised to call the office at [phone] or go straight to Labor and Delivery at [facility] with any of the following concerns vaginal bleeding, leaking of fluid, mars regularly Q 5-7 minutes for over an hour or not feeling the baby move.     Kick counts and labor precautions reviewed

## 2024-06-25 ENCOUNTER — ROUTINE PRENATAL (OUTPATIENT)
Dept: OBGYN CLINIC | Age: 34
End: 2024-06-25

## 2024-06-25 VITALS — BODY MASS INDEX: 26.07 KG/M2 | DIASTOLIC BLOOD PRESSURE: 68 MMHG | WEIGHT: 174 LBS | SYSTOLIC BLOOD PRESSURE: 116 MMHG

## 2024-06-25 DIAGNOSIS — R87.810 ASCUS WITH POSITIVE HIGH RISK HPV CERVICAL: ICD-10-CM

## 2024-06-25 DIAGNOSIS — O34.219 PREVIOUS CESAREAN DELIVERY AFFECTING PREGNANCY: ICD-10-CM

## 2024-06-25 DIAGNOSIS — B00.9 HERPES SIMPLEX TYPE 2 (HSV-2) INFECTION AFFECTING PREGNANCY, ANTEPARTUM, THIRD TRIMESTER: ICD-10-CM

## 2024-06-25 DIAGNOSIS — O99.013 ANEMIA AFFECTING PREGNANCY IN THIRD TRIMESTER: ICD-10-CM

## 2024-06-25 DIAGNOSIS — Z82.79 FAMILY HISTORY OF CONGENITAL HEART DEFECT: ICD-10-CM

## 2024-06-25 DIAGNOSIS — O09.523 HIGH-RISK PREGNANCY, ELDERLY MULTIGRAVIDA IN THIRD TRIMESTER: Primary | ICD-10-CM

## 2024-06-25 DIAGNOSIS — D69.6 THROMBOCYTOPENIA AFFECTING PREGNANCY, ANTEPARTUM (HCC): ICD-10-CM

## 2024-06-25 DIAGNOSIS — O99.119 THROMBOCYTOPENIA AFFECTING PREGNANCY, ANTEPARTUM (HCC): ICD-10-CM

## 2024-06-25 DIAGNOSIS — R87.610 ASCUS WITH POSITIVE HIGH RISK HPV CERVICAL: ICD-10-CM

## 2024-06-25 DIAGNOSIS — Z3A.37 37 WEEKS GESTATION OF PREGNANCY: ICD-10-CM

## 2024-06-25 DIAGNOSIS — O98.513 HERPES SIMPLEX TYPE 2 (HSV-2) INFECTION AFFECTING PREGNANCY, ANTEPARTUM, THIRD TRIMESTER: ICD-10-CM

## 2024-06-25 DIAGNOSIS — Z87.59 HISTORY OF GESTATIONAL HYPERTENSION: ICD-10-CM

## 2024-06-25 DIAGNOSIS — O99.513 ASTHMA AFFECTING PREGNANCY IN THIRD TRIMESTER: ICD-10-CM

## 2024-06-25 DIAGNOSIS — J45.909 ASTHMA AFFECTING PREGNANCY IN THIRD TRIMESTER: ICD-10-CM

## 2024-06-25 LAB
ERYTHROCYTE [DISTWIDTH] IN BLOOD BY AUTOMATED COUNT: 14.6 % (ref 11.9–14.6)
HCT VFR BLD AUTO: 36.9 % (ref 35.8–46.3)
HGB BLD-MCNC: 11.7 G/DL (ref 11.7–15.4)
MCH RBC QN AUTO: 28 PG (ref 26.1–32.9)
MCHC RBC AUTO-ENTMCNC: 31.7 G/DL (ref 31.4–35)
MCV RBC AUTO: 88.3 FL (ref 82–102)
NRBC # BLD: 0 K/UL (ref 0–0.2)
PLATELET # BLD AUTO: 111 K/UL (ref 150–450)
PMV BLD AUTO: 14.2 FL (ref 9.4–12.3)
RBC # BLD AUTO: 4.18 M/UL (ref 4.05–5.2)
WBC # BLD AUTO: 9.2 K/UL (ref 4.3–11.1)

## 2024-06-25 PROCEDURE — 0502F SUBSEQUENT PRENATAL CARE: CPT | Performed by: NURSE PRACTITIONER

## 2024-06-25 NOTE — PATIENT INSTRUCTIONS
PTL/labor precautions, FMC, and pregnancy warning signs reviewed. Pt advised to call the office at [phone] or go straight to Labor and Delivery at [facility] with any of the following concerns vaginal bleeding, leaking of fluid, mars regularly Q 5-7 minutes for over an hour or not feeling the baby move.     Kick counts and labor precautions reviewed     Pre-eclampsia Precautions discussed with the patient including but not limited to: elevated blood pressure, increased swelling in hands/feet/face, persistent headache, visual changes, nausea/vomiting & right upper quadrant pain. Pt advised to call the office at [phone] or go straight to Labor and Delivery at [facility] should any of the above occur.

## 2024-07-03 ENCOUNTER — APPOINTMENT (OUTPATIENT)
Dept: LABOR AND DELIVERY | Age: 34
End: 2024-07-03
Payer: COMMERCIAL

## 2024-07-03 ENCOUNTER — ANESTHESIA EVENT (OUTPATIENT)
Dept: OPERATING ROOM | Age: 34
End: 2024-07-03
Payer: COMMERCIAL

## 2024-07-03 ENCOUNTER — HOSPITAL ENCOUNTER (INPATIENT)
Age: 34
LOS: 2 days | Discharge: HOME | End: 2024-07-05
Attending: OBSTETRICS & GYNECOLOGY | Admitting: OBSTETRICS & GYNECOLOGY
Payer: COMMERCIAL

## 2024-07-03 ENCOUNTER — ANESTHESIA (OUTPATIENT)
Dept: OPERATING ROOM | Age: 34
End: 2024-07-03
Payer: COMMERCIAL

## 2024-07-03 DIAGNOSIS — O34.219 PREVIOUS CESAREAN SECTION COMPLICATING PREGNANCY: Primary | ICD-10-CM

## 2024-07-03 LAB
ABO + RH BLD: NORMAL
BLOOD GROUP ANTIBODIES SERPL: NORMAL
ERYTHROCYTE [DISTWIDTH] IN BLOOD BY AUTOMATED COUNT: 14.6 % (ref 11.9–14.6)
HCT VFR BLD AUTO: 39.6 % (ref 35.8–46.3)
HGB BLD-MCNC: 13 G/DL (ref 11.7–15.4)
MCH RBC QN AUTO: 27.9 PG (ref 26.1–32.9)
MCHC RBC AUTO-ENTMCNC: 32.8 G/DL (ref 31.4–35)
MCV RBC AUTO: 85 FL (ref 82–102)
NRBC # BLD: 0 K/UL (ref 0–0.2)
PLATELET # BLD AUTO: 115 K/UL (ref 150–450)
PMV BLD AUTO: 13.5 FL (ref 9.4–12.3)
RBC # BLD AUTO: 4.66 M/UL (ref 4.05–5.2)
SPECIMEN EXP DATE BLD: NORMAL
T PALLIDUM AB SER QL IA: NONREACTIVE
WBC # BLD AUTO: 9.5 K/UL (ref 4.3–11.1)

## 2024-07-03 PROCEDURE — 6360000002 HC RX W HCPCS: Performed by: OBSTETRICS & GYNECOLOGY

## 2024-07-03 PROCEDURE — 7100000000 HC PACU RECOVERY - FIRST 15 MIN: Performed by: OBSTETRICS & GYNECOLOGY

## 2024-07-03 PROCEDURE — 86900 BLOOD TYPING SEROLOGIC ABO: CPT

## 2024-07-03 PROCEDURE — 6360000002 HC RX W HCPCS: Performed by: NURSE ANESTHETIST, CERTIFIED REGISTERED

## 2024-07-03 PROCEDURE — 2580000003 HC RX 258: Performed by: OBSTETRICS & GYNECOLOGY

## 2024-07-03 PROCEDURE — 86901 BLOOD TYPING SEROLOGIC RH(D): CPT

## 2024-07-03 PROCEDURE — 85027 COMPLETE CBC AUTOMATED: CPT

## 2024-07-03 PROCEDURE — 86780 TREPONEMA PALLIDUM: CPT

## 2024-07-03 PROCEDURE — 6370000000 HC RX 637 (ALT 250 FOR IP): Performed by: OBSTETRICS & GYNECOLOGY

## 2024-07-03 PROCEDURE — 3700000000 HC ANESTHESIA ATTENDED CARE: Performed by: OBSTETRICS & GYNECOLOGY

## 2024-07-03 PROCEDURE — 1100000000 HC RM PRIVATE

## 2024-07-03 PROCEDURE — 6360000002 HC RX W HCPCS: Performed by: ANESTHESIOLOGY

## 2024-07-03 PROCEDURE — 7100000001 HC PACU RECOVERY - ADDTL 15 MIN: Performed by: OBSTETRICS & GYNECOLOGY

## 2024-07-03 PROCEDURE — 3700000001 HC ADD 15 MINUTES (ANESTHESIA): Performed by: OBSTETRICS & GYNECOLOGY

## 2024-07-03 PROCEDURE — 59510 CESAREAN DELIVERY: CPT | Performed by: OBSTETRICS & GYNECOLOGY

## 2024-07-03 PROCEDURE — 3609079900 HC CESAREAN SECTION: Performed by: OBSTETRICS & GYNECOLOGY

## 2024-07-03 PROCEDURE — 6370000000 HC RX 637 (ALT 250 FOR IP): Performed by: ANESTHESIOLOGY

## 2024-07-03 PROCEDURE — 86850 RBC ANTIBODY SCREEN: CPT

## 2024-07-03 PROCEDURE — 2709999900 HC NON-CHARGEABLE SUPPLY: Performed by: OBSTETRICS & GYNECOLOGY

## 2024-07-03 PROCEDURE — 2580000003 HC RX 258: Performed by: NURSE ANESTHETIST, CERTIFIED REGISTERED

## 2024-07-03 PROCEDURE — 2500000003 HC RX 250 WO HCPCS: Performed by: NURSE ANESTHETIST, CERTIFIED REGISTERED

## 2024-07-03 PROCEDURE — 2580000003 HC RX 258: Performed by: ANESTHESIOLOGY

## 2024-07-03 PROCEDURE — 59025 FETAL NON-STRESS TEST: CPT

## 2024-07-03 RX ORDER — ONDANSETRON 2 MG/ML
4 INJECTION INTRAMUSCULAR; INTRAVENOUS EVERY 6 HOURS PRN
Status: DISCONTINUED | OUTPATIENT
Start: 2024-07-03 | End: 2024-07-05 | Stop reason: HOSPADM

## 2024-07-03 RX ORDER — SODIUM CHLORIDE, SODIUM LACTATE, POTASSIUM CHLORIDE, AND CALCIUM CHLORIDE .6; .31; .03; .02 G/100ML; G/100ML; G/100ML; G/100ML
1000 INJECTION, SOLUTION INTRAVENOUS ONCE
Status: COMPLETED | OUTPATIENT
Start: 2024-07-03 | End: 2024-07-03

## 2024-07-03 RX ORDER — FERROUS SULFATE 325(65) MG
325 TABLET ORAL 2 TIMES DAILY WITH MEALS
Status: DISCONTINUED | OUTPATIENT
Start: 2024-07-03 | End: 2024-07-05 | Stop reason: HOSPADM

## 2024-07-03 RX ORDER — DEXAMETHASONE SODIUM PHOSPHATE 10 MG/ML
INJECTION INTRAMUSCULAR; INTRAVENOUS PRN
Status: DISCONTINUED | OUTPATIENT
Start: 2024-07-03 | End: 2024-07-03 | Stop reason: SDUPTHER

## 2024-07-03 RX ORDER — SODIUM CHLORIDE 9 MG/ML
INJECTION, SOLUTION INTRAVENOUS PRN
Status: DISCONTINUED | OUTPATIENT
Start: 2024-07-03 | End: 2024-07-05 | Stop reason: HOSPADM

## 2024-07-03 RX ORDER — LANOLIN
CREAM (ML) TOPICAL
Status: DISCONTINUED | OUTPATIENT
Start: 2024-07-03 | End: 2024-07-05 | Stop reason: HOSPADM

## 2024-07-03 RX ORDER — ACETAMINOPHEN 325 MG/1
650 TABLET ORAL EVERY 6 HOURS PRN
Status: DISPENSED | OUTPATIENT
Start: 2024-07-03 | End: 2024-07-04

## 2024-07-03 RX ORDER — ONDANSETRON 2 MG/ML
INJECTION INTRAMUSCULAR; INTRAVENOUS PRN
Status: DISCONTINUED | OUTPATIENT
Start: 2024-07-03 | End: 2024-07-03 | Stop reason: SDUPTHER

## 2024-07-03 RX ORDER — ONDANSETRON 2 MG/ML
4 INJECTION INTRAMUSCULAR; INTRAVENOUS EVERY 6 HOURS PRN
Status: ACTIVE | OUTPATIENT
Start: 2024-07-03 | End: 2024-07-04

## 2024-07-03 RX ORDER — SODIUM CHLORIDE 0.9 % (FLUSH) 0.9 %
5-40 SYRINGE (ML) INJECTION EVERY 12 HOURS SCHEDULED
Status: DISCONTINUED | OUTPATIENT
Start: 2024-07-03 | End: 2024-07-05 | Stop reason: HOSPADM

## 2024-07-03 RX ORDER — OXYCODONE HYDROCHLORIDE 5 MG/1
10 TABLET ORAL PRN
Status: ACTIVE | OUTPATIENT
Start: 2024-07-03 | End: 2024-07-04

## 2024-07-03 RX ORDER — ONDANSETRON 2 MG/ML
4 INJECTION INTRAMUSCULAR; INTRAVENOUS ONCE
Status: COMPLETED | OUTPATIENT
Start: 2024-07-03 | End: 2024-07-03

## 2024-07-03 RX ORDER — SODIUM CHLORIDE 0.9 % (FLUSH) 0.9 %
10 SYRINGE (ML) INJECTION PRN
Status: DISCONTINUED | OUTPATIENT
Start: 2024-07-03 | End: 2024-07-04

## 2024-07-03 RX ORDER — KETOROLAC TROMETHAMINE 30 MG/ML
INJECTION, SOLUTION INTRAMUSCULAR; INTRAVENOUS PRN
Status: DISCONTINUED | OUTPATIENT
Start: 2024-07-03 | End: 2024-07-03 | Stop reason: SDUPTHER

## 2024-07-03 RX ORDER — DIPHENHYDRAMINE HYDROCHLORIDE 50 MG/ML
12.5 INJECTION INTRAMUSCULAR; INTRAVENOUS EVERY 6 HOURS PRN
Status: ACTIVE | OUTPATIENT
Start: 2024-07-03 | End: 2024-07-04

## 2024-07-03 RX ORDER — CITRIC ACID/SODIUM CITRATE 334-500MG
30 SOLUTION, ORAL ORAL ONCE
Status: DISCONTINUED | OUTPATIENT
Start: 2024-07-03 | End: 2024-07-05 | Stop reason: HOSPADM

## 2024-07-03 RX ORDER — SODIUM CHLORIDE, SODIUM LACTATE, POTASSIUM CHLORIDE, CALCIUM CHLORIDE 600; 310; 30; 20 MG/100ML; MG/100ML; MG/100ML; MG/100ML
INJECTION, SOLUTION INTRAVENOUS CONTINUOUS
Status: DISCONTINUED | OUTPATIENT
Start: 2024-07-03 | End: 2024-07-04

## 2024-07-03 RX ORDER — IBUPROFEN 800 MG/1
800 TABLET ORAL EVERY 8 HOURS
Status: DISCONTINUED | OUTPATIENT
Start: 2024-07-05 | End: 2024-07-04

## 2024-07-03 RX ORDER — ONDANSETRON 2 MG/ML
4 INJECTION INTRAMUSCULAR; INTRAVENOUS EVERY 6 HOURS PRN
Status: DISCONTINUED | OUTPATIENT
Start: 2024-07-04 | End: 2024-07-05 | Stop reason: HOSPADM

## 2024-07-03 RX ORDER — SODIUM CHLORIDE 0.9 % (FLUSH) 0.9 %
5-40 SYRINGE (ML) INJECTION PRN
Status: DISCONTINUED | OUTPATIENT
Start: 2024-07-03 | End: 2024-07-05 | Stop reason: HOSPADM

## 2024-07-03 RX ORDER — KETOROLAC TROMETHAMINE 30 MG/ML
30 INJECTION, SOLUTION INTRAMUSCULAR; INTRAVENOUS EVERY 6 HOURS
Status: DISCONTINUED | OUTPATIENT
Start: 2024-07-04 | End: 2024-07-04

## 2024-07-03 RX ORDER — FAMOTIDINE 20 MG/1
20 TABLET, FILM COATED ORAL 2 TIMES DAILY
Status: DISCONTINUED | OUTPATIENT
Start: 2024-07-03 | End: 2024-07-05 | Stop reason: HOSPADM

## 2024-07-03 RX ORDER — SODIUM CHLORIDE 0.9 % (FLUSH) 0.9 %
5-40 SYRINGE (ML) INJECTION PRN
Status: CANCELLED | OUTPATIENT
Start: 2024-07-03

## 2024-07-03 RX ORDER — SODIUM CHLORIDE, SODIUM LACTATE, POTASSIUM CHLORIDE, CALCIUM CHLORIDE 600; 310; 30; 20 MG/100ML; MG/100ML; MG/100ML; MG/100ML
INJECTION, SOLUTION INTRAVENOUS CONTINUOUS
Status: DISCONTINUED | OUTPATIENT
Start: 2024-07-03 | End: 2024-07-05 | Stop reason: HOSPADM

## 2024-07-03 RX ORDER — SODIUM CHLORIDE 9 MG/ML
INJECTION, SOLUTION INTRAVENOUS PRN
Status: DISCONTINUED | OUTPATIENT
Start: 2024-07-03 | End: 2024-07-04

## 2024-07-03 RX ORDER — SODIUM CHLORIDE, SODIUM LACTATE, POTASSIUM CHLORIDE, CALCIUM CHLORIDE 600; 310; 30; 20 MG/100ML; MG/100ML; MG/100ML; MG/100ML
INJECTION, SOLUTION INTRAVENOUS CONTINUOUS PRN
Status: DISCONTINUED | OUTPATIENT
Start: 2024-07-03 | End: 2024-07-03 | Stop reason: SDUPTHER

## 2024-07-03 RX ORDER — DEXTROSE, SODIUM CHLORIDE, SODIUM LACTATE, POTASSIUM CHLORIDE, AND CALCIUM CHLORIDE 5; .6; .31; .03; .02 G/100ML; G/100ML; G/100ML; G/100ML; G/100ML
INJECTION, SOLUTION INTRAVENOUS CONTINUOUS
Status: DISCONTINUED | OUTPATIENT
Start: 2024-07-03 | End: 2024-07-05 | Stop reason: HOSPADM

## 2024-07-03 RX ORDER — MORPHINE SULFATE 0.5 MG/ML
INJECTION, SOLUTION EPIDURAL; INTRATHECAL; INTRAVENOUS PRN
Status: DISCONTINUED | OUTPATIENT
Start: 2024-07-03 | End: 2024-07-03 | Stop reason: SDUPTHER

## 2024-07-03 RX ORDER — DOCUSATE SODIUM 100 MG/1
100 CAPSULE, LIQUID FILLED ORAL 2 TIMES DAILY
Status: DISCONTINUED | OUTPATIENT
Start: 2024-07-03 | End: 2024-07-05 | Stop reason: HOSPADM

## 2024-07-03 RX ORDER — ACETAMINOPHEN 500 MG
1000 TABLET ORAL EVERY 6 HOURS PRN
Status: DISCONTINUED | OUTPATIENT
Start: 2024-07-04 | End: 2024-07-05 | Stop reason: HOSPADM

## 2024-07-03 RX ORDER — CITRIC ACID/SODIUM CITRATE 334-500MG
30 SOLUTION, ORAL ORAL ONCE
Status: COMPLETED | OUTPATIENT
Start: 2024-07-03 | End: 2024-07-03

## 2024-07-03 RX ORDER — KETOROLAC TROMETHAMINE 30 MG/ML
30 INJECTION, SOLUTION INTRAMUSCULAR; INTRAVENOUS EVERY 6 HOURS PRN
Status: DISPENSED | OUTPATIENT
Start: 2024-07-03 | End: 2024-07-04

## 2024-07-03 RX ORDER — HYDROMORPHONE HYDROCHLORIDE 1 MG/ML
1 INJECTION, SOLUTION INTRAMUSCULAR; INTRAVENOUS; SUBCUTANEOUS EVERY 4 HOURS PRN
Status: ACTIVE | OUTPATIENT
Start: 2024-07-03 | End: 2024-07-04

## 2024-07-03 RX ORDER — OXYCODONE HYDROCHLORIDE 5 MG/1
5 TABLET ORAL EVERY 4 HOURS PRN
Status: DISCONTINUED | OUTPATIENT
Start: 2024-07-04 | End: 2024-07-05 | Stop reason: HOSPADM

## 2024-07-03 RX ORDER — SODIUM CHLORIDE 0.9 % (FLUSH) 0.9 %
10 SYRINGE (ML) INJECTION EVERY 12 HOURS SCHEDULED
Status: DISCONTINUED | OUTPATIENT
Start: 2024-07-03 | End: 2024-07-04

## 2024-07-03 RX ORDER — PRENATAL VIT/IRON FUM/FOLIC AC 27MG-0.8MG
1 TABLET ORAL DAILY
Status: DISCONTINUED | OUTPATIENT
Start: 2024-07-03 | End: 2024-07-05 | Stop reason: HOSPADM

## 2024-07-03 RX ORDER — OXYCODONE HYDROCHLORIDE 5 MG/1
5 TABLET ORAL EVERY 6 HOURS PRN
Status: ACTIVE | OUTPATIENT
Start: 2024-07-03 | End: 2024-07-04

## 2024-07-03 RX ORDER — OXYCODONE HYDROCHLORIDE 5 MG/1
10 TABLET ORAL EVERY 4 HOURS PRN
Status: DISCONTINUED | OUTPATIENT
Start: 2024-07-04 | End: 2024-07-05 | Stop reason: HOSPADM

## 2024-07-03 RX ORDER — LIDOCAINE HYDROCHLORIDE 10 MG/ML
1 INJECTION, SOLUTION INFILTRATION; PERINEURAL
Status: ACTIVE | OUTPATIENT
Start: 2024-07-03 | End: 2024-07-04

## 2024-07-03 RX ORDER — PROCHLORPERAZINE EDISYLATE 5 MG/ML
5 INJECTION INTRAMUSCULAR; INTRAVENOUS EVERY 6 HOURS PRN
Status: ACTIVE | OUTPATIENT
Start: 2024-07-03 | End: 2024-07-04

## 2024-07-03 RX ORDER — MISOPROSTOL 200 UG/1
200 TABLET ORAL PRN
Status: DISCONTINUED | OUTPATIENT
Start: 2024-07-03 | End: 2024-07-05 | Stop reason: HOSPADM

## 2024-07-03 RX ORDER — MORPHINE SULFATE 0.5 MG/ML
INJECTION, SOLUTION EPIDURAL; INTRATHECAL; INTRAVENOUS
Status: COMPLETED | OUTPATIENT
Start: 2024-07-03 | End: 2024-07-03

## 2024-07-03 RX ORDER — EPHEDRINE SULFATE/0.9% NACL/PF 50 MG/5 ML
SYRINGE (ML) INTRAVENOUS PRN
Status: DISCONTINUED | OUTPATIENT
Start: 2024-07-03 | End: 2024-07-03 | Stop reason: SDUPTHER

## 2024-07-03 RX ORDER — PROCHLORPERAZINE EDISYLATE 5 MG/ML
INJECTION INTRAMUSCULAR; INTRAVENOUS PRN
Status: DISCONTINUED | OUTPATIENT
Start: 2024-07-03 | End: 2024-07-03 | Stop reason: SDUPTHER

## 2024-07-03 RX ORDER — ONDANSETRON 4 MG/1
4 TABLET, ORALLY DISINTEGRATING ORAL EVERY 8 HOURS PRN
Status: DISCONTINUED | OUTPATIENT
Start: 2024-07-04 | End: 2024-07-05 | Stop reason: HOSPADM

## 2024-07-03 RX ORDER — SODIUM CHLORIDE 9 MG/ML
INJECTION, SOLUTION INTRAVENOUS PRN
Status: CANCELLED | OUTPATIENT
Start: 2024-07-03

## 2024-07-03 RX ORDER — NALOXONE HYDROCHLORIDE 0.4 MG/ML
INJECTION, SOLUTION INTRAMUSCULAR; INTRAVENOUS; SUBCUTANEOUS PRN
Status: CANCELLED | OUTPATIENT
Start: 2024-07-03

## 2024-07-03 RX ORDER — BUPIVACAINE HYDROCHLORIDE 7.5 MG/ML
INJECTION, SOLUTION INTRASPINAL
Status: COMPLETED | OUTPATIENT
Start: 2024-07-03 | End: 2024-07-03

## 2024-07-03 RX ADMIN — PHENYLEPHRINE HYDROCHLORIDE 200 MCG: 0.1 INJECTION, SOLUTION INTRAVENOUS at 10:08

## 2024-07-03 RX ADMIN — SODIUM CHLORIDE, PRESERVATIVE FREE 10 ML: 5 INJECTION INTRAVENOUS at 23:05

## 2024-07-03 RX ADMIN — Medication 5 MG: at 10:00

## 2024-07-03 RX ADMIN — KETOROLAC TROMETHAMINE 30 MG: 30 INJECTION, SOLUTION INTRAMUSCULAR at 16:55

## 2024-07-03 RX ADMIN — DOCUSATE SODIUM 100 MG: 100 CAPSULE, LIQUID FILLED ORAL at 16:56

## 2024-07-03 RX ADMIN — SODIUM CITRATE AND CITRIC ACID MONOHYDRATE 30 ML: 334; 500 SOLUTION ORAL at 09:09

## 2024-07-03 RX ADMIN — BUPIVACAINE HYDROCHLORIDE IN DEXTROSE 12 MG: 7.5 INJECTION, SOLUTION SUBARACHNOID at 09:45

## 2024-07-03 RX ADMIN — ACETAMINOPHEN 650 MG: 325 TABLET, FILM COATED ORAL at 20:31

## 2024-07-03 RX ADMIN — KETOROLAC TROMETHAMINE 30 MG: 30 INJECTION, SOLUTION INTRAMUSCULAR at 23:02

## 2024-07-03 RX ADMIN — ONDANSETRON 4 MG: 2 INJECTION INTRAMUSCULAR; INTRAVENOUS at 10:11

## 2024-07-03 RX ADMIN — SODIUM CHLORIDE, PRESERVATIVE FREE 10 ML: 5 INJECTION INTRAVENOUS at 23:06

## 2024-07-03 RX ADMIN — SODIUM CHLORIDE, SODIUM LACTATE, POTASSIUM CHLORIDE, AND CALCIUM CHLORIDE: 600; 310; 30; 20 INJECTION, SOLUTION INTRAVENOUS at 10:21

## 2024-07-03 RX ADMIN — KETOROLAC TROMETHAMINE 30 MG: 30 INJECTION, SOLUTION INTRAMUSCULAR; INTRAVENOUS at 10:28

## 2024-07-03 RX ADMIN — MORPHINE SULFATE 1 MG: 0.5 INJECTION, SOLUTION EPIDURAL; INTRATHECAL; INTRAVENOUS at 10:15

## 2024-07-03 RX ADMIN — PHENYLEPHRINE HYDROCHLORIDE 200 MCG: 0.1 INJECTION, SOLUTION INTRAVENOUS at 09:56

## 2024-07-03 RX ADMIN — MORPHINE SULFATE 0.15 MG: 0.5 INJECTION, SOLUTION EPIDURAL; INTRATHECAL; INTRAVENOUS at 09:45

## 2024-07-03 RX ADMIN — PHENYLEPHRINE HYDROCHLORIDE 100 MCG: 0.1 INJECTION, SOLUTION INTRAVENOUS at 10:03

## 2024-07-03 RX ADMIN — CEFAZOLIN 2000 MG: 10 INJECTION, POWDER, FOR SOLUTION INTRAVENOUS at 09:10

## 2024-07-03 RX ADMIN — Medication 10 MG: at 09:51

## 2024-07-03 RX ADMIN — PROCHLORPERAZINE EDISYLATE 5 MG: 5 INJECTION INTRAMUSCULAR; INTRAVENOUS at 10:24

## 2024-07-03 RX ADMIN — Medication 5 MG: at 10:36

## 2024-07-03 RX ADMIN — SODIUM CHLORIDE, SODIUM LACTATE, POTASSIUM CHLORIDE, AND CALCIUM CHLORIDE: 600; 310; 30; 20 INJECTION, SOLUTION INTRAVENOUS at 09:39

## 2024-07-03 RX ADMIN — ONDANSETRON 4 MG: 2 INJECTION INTRAMUSCULAR; INTRAVENOUS at 09:09

## 2024-07-03 RX ADMIN — MORPHINE SULFATE 1 MG: 0.5 INJECTION, SOLUTION EPIDURAL; INTRATHECAL; INTRAVENOUS at 10:13

## 2024-07-03 RX ADMIN — Medication 5 MG: at 10:13

## 2024-07-03 RX ADMIN — Medication 500 ML/HR: at 10:08

## 2024-07-03 RX ADMIN — Medication 10 MG: at 10:09

## 2024-07-03 RX ADMIN — PHENYLEPHRINE HYDROCHLORIDE 200 MCG: 0.1 INJECTION, SOLUTION INTRAVENOUS at 09:55

## 2024-07-03 RX ADMIN — PHENYLEPHRINE HYDROCHLORIDE 100 MCG: 0.1 INJECTION, SOLUTION INTRAVENOUS at 10:36

## 2024-07-03 RX ADMIN — PHENYLEPHRINE HYDROCHLORIDE 100 MCG: 0.1 INJECTION, SOLUTION INTRAVENOUS at 09:51

## 2024-07-03 RX ADMIN — MORPHINE SULFATE 1 MG: 0.5 INJECTION, SOLUTION EPIDURAL; INTRATHECAL; INTRAVENOUS at 10:11

## 2024-07-03 RX ADMIN — DEXAMETHASONE SODIUM PHOSPHATE 8 MG: 10 INJECTION INTRAMUSCULAR; INTRAVENOUS at 10:19

## 2024-07-03 RX ADMIN — SODIUM CHLORIDE, POTASSIUM CHLORIDE, SODIUM LACTATE AND CALCIUM CHLORIDE 1000 ML: 600; 310; 30; 20 INJECTION, SOLUTION INTRAVENOUS at 07:30

## 2024-07-03 RX ADMIN — PHENYLEPHRINE HYDROCHLORIDE 200 MCG: 0.1 INJECTION, SOLUTION INTRAVENOUS at 10:25

## 2024-07-03 RX ADMIN — Medication 10 MG: at 10:03

## 2024-07-03 RX ADMIN — PHENYLEPHRINE HYDROCHLORIDE 100 MCG: 0.1 INJECTION, SOLUTION INTRAVENOUS at 10:21

## 2024-07-03 ASSESSMENT — PAIN DESCRIPTION - DESCRIPTORS
DESCRIPTORS: SORE
DESCRIPTORS: SORE

## 2024-07-03 ASSESSMENT — PAIN SCALES - GENERAL
PAINLEVEL_OUTOF10: 3
PAINLEVEL_OUTOF10: 3

## 2024-07-03 ASSESSMENT — PAIN DESCRIPTION - ORIENTATION
ORIENTATION: LOWER
ORIENTATION: LOWER;ANTERIOR

## 2024-07-03 ASSESSMENT — PAIN DESCRIPTION - LOCATION
LOCATION: ABDOMEN;INCISION
LOCATION: ABDOMEN

## 2024-07-03 ASSESSMENT — LIFESTYLE VARIABLES: SMOKING_STATUS: 0

## 2024-07-03 NOTE — ANESTHESIA POSTPROCEDURE EVALUATION
Department of Anesthesiology  Postprocedure Note    Patient: Skyla Perez  MRN: [id number]  YOB: 1990  Date of evaluation: 7/3/2024    Procedure Summary       Date: 24 Room / Location: [facility] L&D OR  [facility] L&D    Anesthesia Start: 939 Anesthesia Stop: 1047    Procedure:  SECTION (Abdomen) Diagnosis:       History of       (History of  [Z98.891])    Surgeons: Berenice Alvarez MD Responsible Provider: Alberto Guerra MD    Anesthesia Type: spinal ASA Status: 2            Anesthesia Type: No value filed.    Ja Phase I: Ja Score: 9    Ja Phase II: Ja Score: 10    Anesthesia Post Evaluation    Patient location during evaluation: floor  Patient participation: complete - patient participated  Level of consciousness: awake and alert  Airway patency: patent  Nausea & Vomiting: no nausea and no vomiting  Cardiovascular status: hemodynamically stable  Respiratory status: acceptable, nonlabored ventilation and spontaneous ventilation  Hydration status: euvolemic  Comments: /62   Pulse 88   Temp 97.7 °F (36.5 °C) (Oral)   Resp 16   LMP 10/04/2023 (Exact Date)   SpO2 98%   Breastfeeding Unknown     Multimodal analgesia pain management approach  Pain management: adequate and satisfactory to patient        No notable events documented.

## 2024-07-03 NOTE — L&D DELIVERY NOTE
Justin Perez Skyla [[id number]]      Labor Events     Labor: No   Steroids: None  Cervical Ripening Date/Time:      Antibiotics Received during Labor: No  Rupture Date/Time:      Rupture Type: AROM  Fluid Color: Clear  Fluid Odor: None  Fluid Volume: Scant  Induction: AROM  Labor Complications: None              Anesthesia    Method: Spinal       Labor Length    3rd stage: 0h 01m       Delivery Details      Delivery Date: 7/3/24 Delivery Time: 10:07:22   Delivery Type: , Low Transverse  Trial of Labor?: No   Categorization: Repeat   Priority: scheduled  Indications for : Prior Uterine Surgery       Skin Incision Type: Pfannenstiel  Uterine Incision: Low Transverse       [place] Presentation    Presentation: Vertex       Shoulder Dystocia    Shoulder Dystocia Present?: No       Assisted Delivery Details    Forceps Attempted?: No  Vacuum Extractor Attempted?: Yes  Indications: Fetal Heart Rate or Rhythm Abnormality   Vacuum Type: Kiwi   Vacuum Application Location: Outlet                    Number of Pop Offs: 0      Number of Pulls: 1    High End Pressure Range (mmHg): green    Total Vacuum Application Time: 10 seconds   Vacuum Applied By: MARY HEBERT MD    Failed?: No          Cord    Vessels: 3 Vessels  Complications: None  Delayed Cord Clamping?: Yes  Cord Blood Disposition: Lab  Gases Sent?: No              Placenta    Date/Time: 7/3/2024 10:09:00  Removal: Manual Removal  Appearance: Intact  Disposition: Discarded       Lacerations    Episiotomy: None  Perineal Lacerations: None  Other Lacerations: no non-perineal laceration  Number of Repair Packets: 0       Vaginal Counts    Initial Count Personnel: NA  Initial Count Verified By: NA       Blood Loss  Mother: Skyla Perez Tam #[id number]     Start of Mother's Information      Delivery Blood Loss  24 0942 - 24 1139      Quantitative Blood Loss (mL) Hospital Encounter 830 grams    Total  
16

## 2024-07-03 NOTE — H&P
Transportation     Lack of Transportation (Medical): Not on file     Lack of Transportation (Non-Medical): No   Physical Activity: Not on file   Stress: Not on file   Social Connections: Not on file   Intimate Partner Violence: Not on file   Housing Stability: Unknown (5/21/2024)    Housing Stability Vital Sign     Unable to Pay for Housing in the Last Year: Not on file     Number of Places Lived in the Last Year: Not on file     Unstable Housing in the Last Year: No     Family History   Problem Relation Age of Onset    Osteoarthritis Mother     Other Mother         Degenerative bone disorder in back    Depression Mother     Alcohol Abuse Mother     Diabetes Father     Hypertension Father     Depression Father     Other Sister         GI issues and ? Endometriosis    Anxiety Disorder Sister     Headache Brother     Asthma Brother     Psychiatric Disorder Brother         Dissociative personality disorder, Depression    Alcohol Abuse Brother     Other Brother         Born with hole in his heart-could not play sports    Depression Brother     Cerebral Aneurysm Maternal Grandmother     Stroke Maternal Grandmother     Alcohol Abuse Maternal Grandmother     Prostate Cancer Maternal Grandfather 60    Colon Cancer Maternal Grandfather     Heart Disease Maternal Grandfather     Addiction problem Paternal Grandmother         Addicted to prescription Lortab    Breast Cancer Neg Hx     Ovarian Cancer Neg Hx     Deep Vein Thrombosis Neg Hx     Pulmonary Embolism Neg Hx      No current facility-administered medications on file prior to encounter.     Current Outpatient Medications on File Prior to Encounter   Medication Sig Dispense Refill    omeprazole (PRILOSEC) 20 MG delayed release capsule Take 1 capsule by mouth daily      MAGNESIUM PO Take by mouth      Calcium Carbonate Antacid (TUMS E-X PO) Take by mouth (Patient not taking: Reported on 7/3/2024)      acetaminophen (TYLENOL) 500 MG tablet Take 1 tablet by mouth every 6  hours as needed for Pain      Fe Fum-FePoly-Vit C-Vit B3 (INTEGRA) 62.5-62.5-40-3 MG CAPS Take 1 capsule by mouth daily 30 capsule 5    Prenatal Vit-Fe Fumarate-FA (PRENATAL VITAMIN PO) Take 1 tablet by mouth daily       Allergies   Allergen Reactions    Hydrocodone-Acetaminophen Rash     Developed a rash after taking Lortab while pregnant. No issues since then.        ROS:   Gen: Denies fevers, chills, sweats, anorexia.   CV: Denies chest pains, palpitations, syncope.   Resp: Denies cough, dyspnea, excessive sputum.   GI: Denies nausea, vomiting, diarrhea, constipation.   : Denies incontinence, dysuria, hematuria.   Psych: Denies depression, anxiety  Heme: Denies abnormal bruising, bleeding, enlarged lymph nodes  Neuro:  Denies dizziness, focal weakness, paresthesias, seizures, syncope  Endo: Denies hot/cold intolerance, dry skin  Skin:  Denies rash, suspicious lesions, dryness.     Physical Exam:  Vitals:    24 0829   Resp: 16   Temp: 97.9 °F (36.6 °C)   SpO2: 96%       Exam:   General: awake, alert, NAD  Head: NCAT  Neck: no masses or adenopathy  Chest: no respiratory distress, normal respiratory effort  CV: S1, S2; no LE edema   Abdomen: abd soft, nttp, nd, gravid  : deferred  MSK: normal posture and gait, full ROM  Skin: no lesions or rashes  Neuro: grossly intact  Psych: alert and cooperative; normal mood and affect    A/P   34 y.o.  at 39w0d for Estimated Date of Delivery: 7/10/24 admitted to L&D for delivery by  section.  1. Admit to L&D  2. Routine labs and vitals  3. Continuous maternal/fetal monitoring  4. GBS negative  5.  delivery  --H/o prior two  deliveries   --Placenta anterior  --Antibiotics Ancef 2g  6. D/w staff.  To OR when ready.

## 2024-07-03 NOTE — LACTATION NOTE
This note was copied from a baby's chart.  In to see mom and infant for first time. Mom states overall so far infant has been latching and feeding well. Baby asleep on her chest at this time. Mom states her last child she  for about 2 1/ 2 yrs. Sh has no questions or needs at this time. Reviewed 1st 24 hr feeding/output expectations at this time. Lactation to follow up in am.

## 2024-07-03 NOTE — PROGRESS NOTES
Post-Operative Day Number 2 Progress Note  Skyla Perez  [id number]    Patient doing well post-op day 2 from  delivery without significant complaints.  Pain controlled on current medication.  Voiding without difficulty, normal lochia. Tolerating regular diet    Vitals:  Patient Vitals for the past 24 hrs:   BP Temp Temp src Pulse Resp SpO2   24 1106 102/69 98.3 °F (36.8 °C) Oral 81 18 94 %   24 0740 103/66 98 °F (36.7 °C) Oral 90 18 98 %   24 0314 105/62 -- -- 97 16 94 %   24 2321 90/61 97.7 °F (36.5 °C) Oral 74 18 93 %   24 1939 107/69 97.9 °F (36.6 °C) -- 86 18 94 %   24 1445 103/62 97.7 °F (36.5 °C) Oral 88 16 98 %   24 1345 (!) 100/41 -- -- 67 18 99 %   24 1245 107/64 -- -- 80 16 97 %   24 1230 (!) 103/55 -- -- 71 -- 97 %   24 1215 (!) 105/59 -- -- 88 -- 97 %     Temp (24hrs), Av.9 °F (36.6 °C), Min:97.7 °F (36.5 °C), Max:98.3 °F (36.8 °C)      Vital signs stable, afebrile.    Exam:  Patient without distress.               Abdomen soft, fundus firm at level of umbilicus, nontender.  Incision dry and                      clean without erythema.            Labs:   Recent Results (from the past 24 hour(s))   CBC    Collection Time: 24  6:30 AM   Result Value Ref Range    WBC 11.7 (H) 4.3 - 11.1 K/uL    RBC 3.71 (L) 4.05 - 5.2 M/uL    Hemoglobin 10.5 (L) 11.7 - 15.4 g/dL    Hematocrit 32.3 (L) 35.8 - 46.3 %    MCV 87.1 82.0 - 102.0 FL    MCH 28.3 26.1 - 32.9 PG    MCHC 32.5 31.4 - 35.0 g/dL    RDW 14.6 11.9 - 14.6 %    Platelets 96 (L) 150 - 450 K/uL    MPV 13.7 (H) 9.4 - 12.3 FL    nRBC 0.00 0.0 - 0.2 K/uL       Assessment and Plan:  Patient appears to be having uncomplicated post- course.  Continue routine post-op care and maternal education.    Repeat c/s  Hgb 13-->10.5  Thrombocytopenia. Plts 115-->96  +/+  Hx GHTN prior pregnancy. Flowsheet reviewed. All BPs NL this pregnancy.

## 2024-07-03 NOTE — OP NOTE
Kiwi vaccuum applied, see findings for details. The cord was clamped x2 and cut. The infant was bulb suctioned and then handed to awaiting pediatricians. A cord segment was obtained for cord gases. The placenta was then manually expressed. The hysterotomy was closed with a #1 chromic suture in a running locked fashion. The hysterotomy was inspected and noted to be hemostatic. The pelvis and abdomen were then vigorously irrigated with sterile saline. The hysterotomy and rectus muscles were once more inspected and confirmed to be hemostatic. The fascial incision was then closed with 0-Vicryl in a running fashion. The incision was irrigated with sterile saline and any evidence of subcutaneous bleeding was cauterized with the use of Bovie cautery. The incision was then closed with subcutaneous sutures and 4-0 moncryl and a sterile pressure dressing was placed. The vagina was cleared of all clot and debris. Following the procedure the fundus was firm and bleeding was scant. The patient was taken to the recovery room awake and in stable condition. Sponge, lap and needle counts were correct x3 per nursing.     Electronically signed by Berenice Alvarez MD on 7/3/2024 at 11:39 AM

## 2024-07-03 NOTE — ANESTHESIA PROCEDURE NOTES
Spinal Block    Patient location during procedure: OR  End time: 7/3/2024 9:51 AM  Reason for block: primary anesthetic  Staffing  Performed: anesthesiologist   Anesthesiologist: Alberto Guerra MD  Performed by: Alberto Guerra MD  Authorized by: Alberto Guerra MD    Spinal Block  Patient position: sitting  Prep: ChloraPrep  Patient monitoring: cardiac monitor, continuous pulse ox, frequent blood pressure checks and oxygen  Approach: midline  Location: L4/L5  Provider prep: mask and sterile gloves  Needle  Needle type: pencil-tip   Needle gauge: 25 G  Needle length: 3.5 in  Assessment  Sensory level: T8  Swirl obtained: Yes  CSF: clear  Attempts: 1  Hemodynamics: stable  Additional Notes  Time out at 0945  Preanesthetic Checklist  Completed: patient identified, IV checked, risks and benefits discussed, surgical/procedural consents, equipment checked, pre-op evaluation, timeout performed, anesthesia consent given, oxygen available and monitors applied/VS acknowledged

## 2024-07-03 NOTE — ANESTHESIA PRE PROCEDURE
syringe  2,000 mg IntraVENous Once Berenice Alvarez MD        citric acid-sodium citrate (BICITRA) solution 30 mL  30 mL Oral Once Berenice Alvarez MD        oxytocin (PITOCIN) 30 units in 500 mL infusion  87.3 carlos a-units/min IntraVENous Continuous PRN Berenice Alvarez MD        And    oxytocin (PITOCIN) 10 unit bolus from the bag  10 Units IntraVENous PRN Berenice Alvarez MD        ondansetron (ZOFRAN) injection 4 mg  4 mg IntraVENous Q6H PRN Berenice Alvarez MD           Allergies:    Allergies   Allergen Reactions    Hydrocodone-Acetaminophen Rash     Developed a rash after taking Lortab while pregnant. No issues since then.        Problem List:    Patient Active Problem List   Diagnosis Code    Anemia affecting pregnancy in second trimester O99.012    ASCUS with positive high risk HPV cervical R87.610, R87.810    Previous  delivery affecting pregnancy O34.219    History of gestational hypertension x 2 Z87.59    Herpes simplex type 2 (HSV-2) infection affecting pregnancy, antepartum, second trimester O98.512, B00.9    High-risk pregnancy, elderly multigravida in third trimester O09.523    Thrombocytopenia affecting pregnancy, antepartum (HCC) O99.119, D69.6    Asthma affecting pregnancy in second trimester O99.512, J45.909    Family history of congenital heart defect Z82.79    Previous  section complicating pregnancy O34.219       Past Medical History:        Diagnosis Date    Abnormal Papanicolaou smear of cervix     +HPV, Colposcopy    Anemia     Asthma     Bronchiole asthma-due to mother smoking when younger    Encounter for supervision of other normal pregnancy, first trimester 2020    Endometriosis     Laparoscopy ~ 14 yo, Dr Robles told pt + endometriosis:  uterus, ovary, intestine    Gastroesophageal reflux disease with esophagitis 5/3/2019    Since G1    History of gestational hypertension     HSV-2 (herpes simplex virus 2) infection

## 2024-07-04 ENCOUNTER — ANESTHESIA EVENT (OUTPATIENT)
Dept: MOTHER INFANT UNIT | Age: 34
End: 2024-07-04
Payer: COMMERCIAL

## 2024-07-04 ENCOUNTER — ANESTHESIA (OUTPATIENT)
Dept: MOTHER INFANT UNIT | Age: 34
End: 2024-07-04
Payer: COMMERCIAL

## 2024-07-04 LAB
ERYTHROCYTE [DISTWIDTH] IN BLOOD BY AUTOMATED COUNT: 14.6 % (ref 11.9–14.6)
HCT VFR BLD AUTO: 32.3 % (ref 35.8–46.3)
HGB BLD-MCNC: 10.5 G/DL (ref 11.7–15.4)
MCH RBC QN AUTO: 28.3 PG (ref 26.1–32.9)
MCHC RBC AUTO-ENTMCNC: 32.5 G/DL (ref 31.4–35)
MCV RBC AUTO: 87.1 FL (ref 82–102)
NRBC # BLD: 0 K/UL (ref 0–0.2)
PLATELET # BLD AUTO: 96 K/UL (ref 150–450)
PMV BLD AUTO: 13.7 FL (ref 9.4–12.3)
RBC # BLD AUTO: 3.71 M/UL (ref 4.05–5.2)
WBC # BLD AUTO: 11.7 K/UL (ref 4.3–11.1)

## 2024-07-04 PROCEDURE — 36415 COLL VENOUS BLD VENIPUNCTURE: CPT

## 2024-07-04 PROCEDURE — 6360000002 HC RX W HCPCS: Performed by: ANESTHESIOLOGY

## 2024-07-04 PROCEDURE — 6370000000 HC RX 637 (ALT 250 FOR IP): Performed by: OBSTETRICS & GYNECOLOGY

## 2024-07-04 PROCEDURE — 6370000000 HC RX 637 (ALT 250 FOR IP): Performed by: ANESTHESIOLOGY

## 2024-07-04 PROCEDURE — 1100000000 HC RM PRIVATE

## 2024-07-04 PROCEDURE — 85027 COMPLETE CBC AUTOMATED: CPT

## 2024-07-04 RX ORDER — IBUPROFEN 800 MG/1
800 TABLET ORAL EVERY 8 HOURS
Status: DISCONTINUED | OUTPATIENT
Start: 2024-07-04 | End: 2024-07-05 | Stop reason: HOSPADM

## 2024-07-04 RX ADMIN — FAMOTIDINE 20 MG: 20 TABLET, FILM COATED ORAL at 09:57

## 2024-07-04 RX ADMIN — OXYCODONE HYDROCHLORIDE 5 MG: 5 TABLET ORAL at 12:25

## 2024-07-04 RX ADMIN — DOCUSATE SODIUM 100 MG: 100 CAPSULE, LIQUID FILLED ORAL at 08:21

## 2024-07-04 RX ADMIN — KETOROLAC TROMETHAMINE 30 MG: 30 INJECTION, SOLUTION INTRAMUSCULAR at 05:02

## 2024-07-04 RX ADMIN — IBUPROFEN 800 MG: 800 TABLET, FILM COATED ORAL at 22:01

## 2024-07-04 RX ADMIN — DOCUSATE SODIUM 100 MG: 100 CAPSULE, LIQUID FILLED ORAL at 20:09

## 2024-07-04 RX ADMIN — FAMOTIDINE 20 MG: 20 TABLET, FILM COATED ORAL at 20:08

## 2024-07-04 RX ADMIN — ACETAMINOPHEN 650 MG: 325 TABLET, FILM COATED ORAL at 08:20

## 2024-07-04 RX ADMIN — ACETAMINOPHEN 650 MG: 325 TABLET, FILM COATED ORAL at 03:11

## 2024-07-04 RX ADMIN — OXYCODONE HYDROCHLORIDE 5 MG: 5 TABLET ORAL at 20:09

## 2024-07-04 RX ADMIN — IBUPROFEN 800 MG: 800 TABLET, FILM COATED ORAL at 14:59

## 2024-07-04 RX ADMIN — FERROUS SULFATE TAB 325 MG (65 MG ELEMENTAL FE) 325 MG: 325 (65 FE) TAB at 16:59

## 2024-07-04 RX ADMIN — ACETAMINOPHEN 1000 MG: 500 TABLET, FILM COATED ORAL at 16:59

## 2024-07-04 RX ADMIN — FERROUS SULFATE TAB 325 MG (65 MG ELEMENTAL FE) 325 MG: 325 (65 FE) TAB at 08:20

## 2024-07-04 RX ADMIN — PRENATAL VIT W/ FE FUMARATE-FA TAB 27-0.8 MG 1 TABLET: 27-0.8 TAB at 08:20

## 2024-07-04 ASSESSMENT — PAIN DESCRIPTION - LOCATION
LOCATION: ABDOMEN
LOCATION: ABDOMEN
LOCATION: ABDOMEN;INCISION
LOCATION: ABDOMEN

## 2024-07-04 ASSESSMENT — PAIN DESCRIPTION - DESCRIPTORS
DESCRIPTORS: ACHING
DESCRIPTORS: ACHING

## 2024-07-04 ASSESSMENT — PAIN SCALES - GENERAL
PAINLEVEL_OUTOF10: 6
PAINLEVEL_OUTOF10: 5
PAINLEVEL_OUTOF10: 2

## 2024-07-04 ASSESSMENT — PAIN DESCRIPTION - ORIENTATION: ORIENTATION: ANTERIOR;LOWER

## 2024-07-04 NOTE — PROGRESS NOTES
Shift assessment complete as noted. Patient denies needs. Ambulation encouraged. Questions encouraged and answered. Encouraged to call for needs or concerns. Verbalizes understanding.

## 2024-07-04 NOTE — PROGRESS NOTES
Coleman d/c'd, IV capped, sequential device discontinued. Ambulated to restroom for teaching of heather-care and pad change. Bed linen changed. Returned to bed safely. Tolerated without difficulty.

## 2024-07-04 NOTE — ANESTHESIA POST-OP
Anesthesiology  Post-op Note    Post-op day 1 s/p  via spinal with neuraxial opioids for post-op pain management.  /62   Pulse 97   Temp 97.7 °F (36.5 °C) (Oral)   Resp 16   LMP 10/04/2023 (Exact Date)   SpO2 94%   Breastfeeding Unknown   Airway patent, patient appropriately hydrated and appears euvolemic.  Patient is Alert and oriented.  Pain is well controlled.  Pruritus is well controlled.  Nausea is well controlled.  No complaints about back or site of injection.  Motor and sensory function has returned to baseline in lower extremities. Patient is satisfied with anesthetic and reports no complications.  Continue current orders, then initiate surgeon's orders for pain management 24 hours after .  Follow up per surgeon.

## 2024-07-04 NOTE — PLAN OF CARE
Problem: Postpartum  Goal: Experiences normal postpartum course  Description:  Postpartum OB-Pregnancy care plan goal which identifies if the mother is experiencing a normal postpartum course  7/3/2024 2204 by Navya Chambers RN  Outcome: Progressing  7/3/2024 1259 by Kerri Preston RN  Outcome: Progressing  Goal: Appropriate maternal -  bonding  Description:  Postpartum OB-Pregnancy care plan goal which identifies if the mother and  are bonding appropriately  7/3/2024 2204 by Navya Chambers RN  Outcome: Progressing  7/3/2024 1259 by Kerri Preston RN  Outcome: Progressing  Goal: Establishment of infant feeding pattern  Description:  Postpartum OB-Pregnancy care plan goal which identifies if the mother is establishing a feeding pattern with their   7/3/2024 2204 by Navya Chambers RN  Outcome: Progressing  7/3/2024 1259 by Kerri Preston RN  Outcome: Progressing  Goal: Incisions, wounds, or drain sites healing without S/S of infection  7/3/2024 2204 by Navya Chambers RN  Outcome: Progressing  7/3/2024 1259 by Kerri Preston RN  Outcome: Progressing     Problem: Pain  Goal: Verbalizes/displays adequate comfort level or baseline comfort level  7/3/2024 1259 by Kerri Preston RN  Outcome: Progressing     Problem: Infection - Adult  Goal: Absence of infection during hospitalization  7/3/2024 2204 by Navya Chambers RN  Outcome: Progressing  7/3/2024 1259 by Kerri Preston RN  Outcome: Progressing     Problem: Safety - Adult  Goal: Free from fall injury  7/3/2024 2204 by Navya Chambers RN  Outcome: Progressing  7/3/2024 1259 by Kerri Preston RN  Outcome: Progressing

## 2024-07-04 NOTE — CARE COORDINATION
Chart reviewed - no needs identified.  SW met with patient to complete initial assessment.    Patient denies any history of postpartum depression/anxiety.      Patient given informational packet on  mood & anxiety disorders (resources/education).    Family denies any additional needs from  at this time.  Family has 's contact information should any needs/questions arise.    Caitlin Leblanc, KASSANDRA-MIKE, PM-C  [facility]   [phone]

## 2024-07-04 NOTE — LACTATION NOTE
This note was copied from a baby's chart.  Experienced mom reports baby breastfeeding well.  No problems or questions.  Encouraged frequent feeding.  Watch output.  Offered to visualize latch prior to discharge.  Mom declined, will call out if assistance desired.

## 2024-07-05 VITALS
OXYGEN SATURATION: 97 % | SYSTOLIC BLOOD PRESSURE: 110 MMHG | RESPIRATION RATE: 15 BRPM | DIASTOLIC BLOOD PRESSURE: 72 MMHG | HEART RATE: 61 BPM | TEMPERATURE: 97.5 F

## 2024-07-05 PROCEDURE — 6370000000 HC RX 637 (ALT 250 FOR IP): Performed by: OBSTETRICS & GYNECOLOGY

## 2024-07-05 RX ORDER — IBUPROFEN 800 MG/1
800 TABLET ORAL EVERY 8 HOURS PRN
Qty: 60 TABLET | Refills: 0 | Status: SHIPPED | OUTPATIENT
Start: 2024-07-05

## 2024-07-05 RX ORDER — SIMETHICONE 80 MG
80 TABLET,CHEWABLE ORAL EVERY 6 HOURS PRN
Status: DISCONTINUED | OUTPATIENT
Start: 2024-07-05 | End: 2024-07-05 | Stop reason: HOSPADM

## 2024-07-05 RX ORDER — OXYCODONE HYDROCHLORIDE 5 MG/1
5 TABLET ORAL EVERY 4 HOURS PRN
Qty: 15 TABLET | Refills: 0 | Status: SHIPPED | OUTPATIENT
Start: 2024-07-05 | End: 2024-07-10

## 2024-07-05 RX ADMIN — ACETAMINOPHEN 1000 MG: 500 TABLET, FILM COATED ORAL at 09:03

## 2024-07-05 RX ADMIN — IBUPROFEN 800 MG: 800 TABLET, FILM COATED ORAL at 05:55

## 2024-07-05 RX ADMIN — OXYCODONE HYDROCHLORIDE 10 MG: 5 TABLET ORAL at 00:07

## 2024-07-05 RX ADMIN — FAMOTIDINE 20 MG: 20 TABLET, FILM COATED ORAL at 09:03

## 2024-07-05 RX ADMIN — PRENATAL VIT W/ FE FUMARATE-FA TAB 27-0.8 MG 1 TABLET: 27-0.8 TAB at 09:03

## 2024-07-05 RX ADMIN — FERROUS SULFATE TAB 325 MG (65 MG ELEMENTAL FE) 325 MG: 325 (65 FE) TAB at 09:04

## 2024-07-05 RX ADMIN — SIMETHICONE 80 MG: 80 TABLET, CHEWABLE ORAL at 09:28

## 2024-07-05 RX ADMIN — DOCUSATE SODIUM 100 MG: 100 CAPSULE, LIQUID FILLED ORAL at 09:03

## 2024-07-05 RX ADMIN — OXYCODONE HYDROCHLORIDE 5 MG: 5 TABLET ORAL at 09:03

## 2024-07-05 RX ADMIN — ACETAMINOPHEN 1000 MG: 500 TABLET, FILM COATED ORAL at 00:05

## 2024-07-05 ASSESSMENT — PAIN DESCRIPTION - ORIENTATION: ORIENTATION: ANTERIOR;LOWER

## 2024-07-05 ASSESSMENT — PAIN DESCRIPTION - LOCATION: LOCATION: ABDOMEN

## 2024-07-05 ASSESSMENT — PAIN SCALES - GENERAL: PAINLEVEL_OUTOF10: 8

## 2024-07-05 NOTE — DISCHARGE INSTRUCTIONS
Learning About Preeclampsia After Childbirth  What is preeclampsia?     Preeclampsia is high blood pressure and signs of organ damage, such as protein in the urine, usually after 20 weeks of pregnancy. If it's not treated, preeclampsia can harm you or your baby.  Severe preeclampsia can lead to dangerous seizures (eclampsia). When preeclampsia affects the liver, it can cause HELLP syndrome, a blood-clotting and bleeding problem. HELLP can come on quickly and can be dangerous. This is why your doctor checks you and your baby often.  Preeclampsia usually goes away after the baby is born. But symptoms may last or get worse after delivery. In rare cases, symptoms may not show up until days or even weeks after childbirth.  What are the symptoms?  Mild preeclampsia usually doesn't cause symptoms. But it may cause rapid weight gain and sudden swelling of the hands and face. Severe preeclampsia can cause symptoms such as a severe headache, vision problems, and trouble breathing. It also can cause belly pain. And you may urinate less than usual.  What can you expect after you've had preeclampsia?  In the hospital  After the baby and the placenta are delivered, preeclampsia usually starts to get better. Most people get better in the first few days after childbirth.  After having preeclampsia, you still have a risk of seizures for a day or more after childbirth. (In very rare cases, seizures happen later on.) So your doctor may have you take magnesium sulfate for a day or more to prevent seizures. You may also take medicine to lower your blood pressure.  When you go home  Your blood pressure will most likely return to normal a few days after delivery. Your doctor will want to check your blood pressure sometime in the first week after you leave the hospital.  High blood pressure sometimes continues after childbirth. But it usually returns to normal levels with time.  Take and record your blood pressure at home if your doctor  as:  Sudden swelling of your face, hands, or feet.  New vision problems (such as dimness, blurring, or seeing spots).  A severe headache.     Your blood pressure is very high, such as 160/110 or higher.     Your blood pressure is higher than your doctor told you it should be, or it rises quickly.     You have new nausea or vomiting.     You have pain in your belly or pelvis.     You gain weight rapidly.   Where can you learn more?  Go to https://www.[employer].net/patientEd and enter Q718 to learn more about \"Learning About Preeclampsia After Childbirth.\"  Current as of: July 10, 2023  Content Version: 14.1  © 7721-0543 [employer].   Care instructions adapted under license by [employer]. If you have questions about a medical condition or this instruction, always ask your healthcare professional. [employer] disclaims any warranty or liability for your use of this information.

## 2024-07-05 NOTE — DISCHARGE SUMMARY
Obstetrical Discharge Summary     Name: Skyla Perez MRN: [id number]  SSN: [id number]    YOB: 1990  Age: 34 y.o.  Sex: female      Allergies: Hydrocodone-acetaminophen    Admit Date: 7/3/2024    Discharge Date: 2024     Admitting Physician: Berenice Alvarez MD     Attending Physician:  Berenice Alvarez,*     * Admission Diagnoses: History of  [Z98.891]  Previous  section complicating pregnancy [O34.219]    * Discharge Diagnoses: History of  [Z98.891]  Previous  section complicating pregnancy [O34.219]              Additional Diagnoses:   Hospital Problems             Last Modified POA    * (Principal) Previous  section complicating pregnancy 7/3/2024 Yes    Anemia affecting pregnancy in second trimester 7/3/2024 Yes    Overview Addendum 3/21/2024  2:11 PM by Katelyn Barnes RN     24 [facility]: Anemia noted on recent lab studies. Physiologic anemia of pregnancy is usually mild.   Start iron supplementation. Rx given.     Counseled to take iron with vitamin C, and to avoid taking with iron rich meals.     Take with stool softener, if not included in iron formulation.     24 [facility]: Hgb-10.9 on . Reports taking iron supplement daily.                  History of gestational hypertension x 2 7/3/2024 Yes    Overview Signed 2024  2:26 PM by Imelda Acevedo MD     Rec ASA qday.          High-risk pregnancy, elderly multigravida in third trimester 7/3/2024 Yes    Overview Addendum 3/21/2024  2:11 PM by Katelyn Barnes RN     2024 at [facility]: Normal anatomy/echo, ANNY WNL, Negative NIPT. For full details review formal ultrasound report.   24 [facility]: Reassuring fetal status. Normal f/u anatomy/echo, AC- 75. For full details review formal ultrasound report.              Thrombocytopenia affecting pregnancy, antepartum (HCC) 7/3/2024 Yes    Overview Addendum 2024  7:05 PM by Chelsey Purcell, APRN - NP      24 [facility]: 24 plts 126   24 [facility]: 24 Plts 134  24 [facility]: plts 117  24 [facility]:  plts 112  24 [facility]: plts 111           Asthma affecting pregnancy in second trimester 7/3/2024 Yes    Overview Signed 3/21/2024  2:12 PM by Katelyn Barnes RN     24 [facility]: Reports h/o childhood asthma, has not used inhaler in > 15 years. Denies s/sx.                All lab results for the last 24 hours reviewed.  CBC:    Recent Labs     24  0730 24  0630   WBC 9.5 11.7*   HGB 13.0 10.5*   HCT 39.6 32.3*   * 96*        Immunizations:    Immunization History   Administered Date(s) Administered    COVID-19, PFIZER PURPLE top, DILUTE for use, (age 12 y+), 30mcg/0.3mL 2021, 09/15/2021    Influenza Virus Vaccine 10/28/2019, 11/10/2019, 10/28/2020    Influenza, FLUARIX, FLULAVAL, FLUZONE (age 6 mo+) AND AFLURIA, (age 3 y+), PF, 0.5mL 10/28/2019    Influenza, FLUCELVAX, (age 6 mo+), MDCK, PF, 0.5mL 10/28/2020    TDaP, ADACEL (age 10y-64y), BOOSTRIX (age 10y+), IM, 0.5mL 2019, 2020       Group Beta Strep: No components found for: \"OBEXTGRBS\"        Lab Results   Component Value Date/Time    ABORH A POSITIVE 2024 07:30 AM      Immunization History   Administered Date(s) Administered    COVID-19, PFIZER PURPLE top, DILUTE for use, (age 12 y+), 30mcg/0.3mL 2021, 09/15/2021    Influenza Virus Vaccine 10/28/2019, 11/10/2019, 10/28/2020    Influenza, FLUARIX, FLULAVAL, FLUZONE (age 6 mo+) AND AFLURIA, (age 3 y+), PF, 0.5mL 10/28/2019    Influenza, FLUCELVAX, (age 6 mo+), MDCK, PF, 0.5mL 10/28/2020    TDaP, ADACEL (age 10y-64y), BOOSTRIX (age 10y+), IM, 0.5mL 2019, 2020       * Procedures:   Procedure(s):   SECTION         * Discharge Condition: Good    * Hospital Course: Normal hospital course following the delivery.    * Disposition: Home    Discharge Medications:      Medication List        START taking these medications      ibuprofen 800 MG

## 2024-07-05 NOTE — PLAN OF CARE
Problem: Pain  Goal: Verbalizes/displays adequate comfort level or baseline comfort level  Outcome: Progressing  Flowsheets (Taken 2024)  Verbalizes/displays adequate comfort level or baseline comfort level:   Encourage patient to monitor pain and request assistance   Assess pain using appropriate pain scale   Administer analgesics based on type and severity of pain and evaluate response   Implement non-pharmacological measures as appropriate and evaluate response   Consider cultural and social influences on pain and pain management   Notify Licensed Independent Practitioner if interventions unsuccessful or patient reports new pain     Problem: Postpartum  Goal: Experiences normal postpartum course  Description:  Postpartum OB-Pregnancy care plan goal which identifies if the mother is experiencing a normal postpartum course  Outcome: Progressing  Goal: Appropriate maternal -  bonding  Description:  Postpartum OB-Pregnancy care plan goal which identifies if the mother and  are bonding appropriately  Outcome: Progressing  Goal: Establishment of infant feeding pattern  Description:  Postpartum OB-Pregnancy care plan goal which identifies if the mother is establishing a feeding pattern with their   Outcome: Progressing  Goal: Incisions, wounds, or drain sites healing without S/S of infection  Outcome: Progressing  Flowsheets (Taken 2024)  Incisions, Wounds, or Drain Sites Healing Without Sign and Symptoms of Infection:   ADMISSION and DAILY: Assess and document risk factors for pressure ulcer development   TWICE DAILY: Assess and document skin integrity     Problem: Infection - Adult  Goal: Absence of infection during hospitalization  Outcome: Progressing     Problem: Safety - Adult  Goal: Free from fall injury  Outcome: Progressing     Problem: Discharge Planning  Goal: Discharge to home or other facility with appropriate resources  Outcome: Progressing

## 2024-07-05 NOTE — PROGRESS NOTES
Progress Note                               Patient: Skyla Perez MRN: [id number]  SSN: [id number]    YOB: 1990  Age: 34 y.o.  Sex: female      Postpartum Day Number 2 Days Post-Op     Subjective:     Patient doing well without significant complaints. Ambulating, voiding without difficulty and Patient desires early discharge today. Patient reports normal lochia.. Infant: Breastfeeding and/or pumping    Objective:     Patient Vitals for the past 18 hrs:   Temp Pulse Resp BP SpO2   24 0731 98.1 °F (36.7 °C) 76 16 98/61 99 %   24 0304 97.7 °F (36.5 °C) 72 18 (!) 96/58 93 %   24 2321 98.2 °F (36.8 °C) 69 18 101/67 95 %   24 1914 97.9 °F (36.6 °C) 76 18 114/69 96 %        Temp (24hrs), Av °F (36.7 °C), Min:97.7 °F (36.5 °C), Max:98.2 °F (36.8 °C)      Physical Exam:    Patient without distress. Neuro / Psych grossly WNL, A&O X 3, Abdomen: Abdomen appropriately tender, Fundus Firm, without erythema, without hematoma, and without evidence of infection    Lab/Data Review:   Recent Labs     24  0630 24  0730 24  0857   WBC 11.7* 9.5 9.2   HGB 10.5* 13.0 11.7   HCT 32.3* 39.6 36.9   MCV 87.1 85.0 88.3   PLT 96* 115* 111*       All lab results for the last 24 hours reviewed.  CBC:    Recent Labs     24  0730 24  0630   WBC 9.5 11.7*   HGB 13.0 10.5*   HCT 39.6 32.3*   * 96*     Blood Type:   Lab Results   Component Value Date/Time    ABORH A POSITIVE 2024 07:30 AM         Assessment and Plan:     Patient appears to be having an uncomplicated postpartum course. Continue routine perineal care and maternal education., Will discharge home today.    Vipul Medel MD

## 2024-07-18 ENCOUNTER — POSTPARTUM VISIT (OUTPATIENT)
Dept: OBGYN CLINIC | Age: 34
End: 2024-07-18

## 2024-07-18 VITALS — SYSTOLIC BLOOD PRESSURE: 120 MMHG | DIASTOLIC BLOOD PRESSURE: 70 MMHG | BODY MASS INDEX: 20.38 KG/M2 | WEIGHT: 136 LBS

## 2024-07-18 PROCEDURE — 0503F POSTPARTUM CARE VISIT: CPT | Performed by: OBSTETRICS & GYNECOLOGY

## 2024-07-18 NOTE — PROGRESS NOTES
PPClinic note    Skyla Perez is a 34 y.o.  PPD # s/p 2 weeks Repeat LTCD on 7/3/2024. She present today for postpartum follow-up  Lochia less than menses  Mood stable  Normal bladder and bowel function    Vitals:    24 1116   BP: 120/70      PE  Gen: NAD   Pulm: CTAB, No increased WOB   Abd: Soft, non tender to palpation, fundus less than umbilicus, incision c/d/i  Extr: No edema, NTTP    Skyla Perez is a 34 y.o.  PPD # pp  --RTC in 4 weeks final pp visit

## 2024-08-08 ENCOUNTER — POSTPARTUM VISIT (OUTPATIENT)
Dept: OBGYN CLINIC | Age: 34
End: 2024-08-08

## 2024-08-08 VITALS — WEIGHT: 155 LBS | BODY MASS INDEX: 23.22 KG/M2 | DIASTOLIC BLOOD PRESSURE: 60 MMHG | SYSTOLIC BLOOD PRESSURE: 120 MMHG

## 2024-08-08 PROCEDURE — 0503F POSTPARTUM CARE VISIT: CPT | Performed by: OBSTETRICS & GYNECOLOGY

## 2024-08-08 NOTE — PROGRESS NOTES
PPClinic note    Skyla Perez is a 34 y.o.  PPD # 5 weeks s/p Repeat LTCD on 7/3/2024. She present today for postpartum follow-up  Lochia less than menses  Mood stable  Normal bladder and bowel function  declines contraception    There were no vitals filed for this visit.   PE  Gen: NAD   Pulm: CTAB, No increased WOB   Abd: Soft, non tender to palpation, fundus less than umbilicus, incision c/d/i  Extr: No edema, NTTP    Skyla Perez is a 34 y.o.  pp  --RTC in annual

## (undated) DEVICE — AMD ANTIMICROBIAL NON-ADHERENT PAD,0.2% POLYHEXAMETHYLENE BIGUANIDE HCI (PHMB): Brand: TELFA

## (undated) DEVICE — SUTURE MONOCRYL SZ 4-0 L27IN ABSRB UD L19MM PS-2 1/2 CIR PRIM Y426H

## (undated) DEVICE — TUBING, SUCTION, 1/4" X 10', STRAIGHT: Brand: MEDLINE

## (undated) DEVICE — PENCIL ES L3M BTTN SWCH S STL HEX LOK BLDE ELECTRD HOLSTER

## (undated) DEVICE — Device: Brand: PORTEX

## (undated) DEVICE — KIWI® VACUUM DELIVERY SYSTEM, OMNICUP®: Brand: KIWI® OMNICUP®

## (undated) DEVICE — ELECTRODE PT RET AD L9FT HI MOIST COND ADH HYDRGEL CORDED

## (undated) DEVICE — SURGICAL PROCEDURE PACK C SECT CDS

## (undated) DEVICE — SOLUTION IV 1000ML 0.9% SOD CHL

## (undated) DEVICE — KENDALL SCD EXPRESS SLEEVES, KNEE LENGTH, MEDIUM: Brand: KENDALL SCD

## (undated) DEVICE — 3M™ STERI-STRIP™ REINFORCED ADHESIVE SKIN CLOSURES, R1547, 1/2 IN X 4 IN (12 MM X 100 MM), 6 STRIPS/ENVELOPE: Brand: 3M™ STERI-STRIP™

## (undated) DEVICE — TRAY PREP DRY W/ PREM GLV 2 APPL 6 SPNG 2 UNDPD 1 OVERWRAP

## (undated) DEVICE — SOLUTION IRRIG 1000ML H2O STRL BLT

## (undated) DEVICE — SUTURE VICRYL SZ 2-0 L36IN ABSRB VLT L36MM CT-1 1/2 CIR J345H

## (undated) DEVICE — TRAY CATH 16FR PVC INTMIT STR TIP PREATTACH TO 1000ML COLL

## (undated) DEVICE — SUTURE VICRYL 2-0 L27IN ABSRB CT BRAID COAT UD J275H

## (undated) DEVICE — SUTURE CHROMIC GUT SZ 1 L36IN ABSRB BRN L48MM CTX 1/2 CIR 905H

## (undated) DEVICE — SUTURE VICRYL SZ 0 L36IN ABSRB UD L36MM CT-1 1/2 CIR J946H

## (undated) DEVICE — AMD ANTIMICROBIAL GAUZE SPONGES,12 PLY USP TYPE VII, 0.2% POLYHEXAMETHYLENE BIGUANIDE HCI (PHMB): Brand: CURITY